# Patient Record
Sex: FEMALE | Race: WHITE
[De-identification: names, ages, dates, MRNs, and addresses within clinical notes are randomized per-mention and may not be internally consistent; named-entity substitution may affect disease eponyms.]

---

## 2017-02-08 ENCOUNTER — HOSPITAL ENCOUNTER (OUTPATIENT)
Dept: HOSPITAL 75 - RAD | Age: 82
End: 2017-02-08
Attending: ORTHOPAEDIC SURGERY
Payer: MEDICARE

## 2017-02-08 DIAGNOSIS — M51.17: Primary | ICD-10-CM

## 2017-02-08 DIAGNOSIS — M41.9: ICD-10-CM

## 2017-02-08 PROCEDURE — 72148 MRI LUMBAR SPINE W/O DYE: CPT

## 2017-02-08 NOTE — XMS REPORT
Continuity of Care Document

 Created on: 2015



MASSIEL LOPEZ

External Reference #: [id number]

: 1931

Sex: Female



Demographics







 Address  [address] [place]  [address]

 

 Home Phone  [phone]

 

 Preferred Language  English

 

 Marital Status  Unknown

 

 Restorationism Affiliation  Unknown

 

 Race  Unknown

 

 Ethnic Group  Unknown





Author







 Author  [facility]

 

 Organization  [facility]

 

 Address  Unknown

 

 Phone  Unavailable







Support







 Name  Relationship  Address  Phone

 

 ASHLEY MARMOLEJO DO  Caregiver  [address]

[place]  [address] [phone]

 

 JACINTA WOODWARD FACP CCDS MD FACC  Caregiver  [address]

[place]  [address] [phone]

 

 KASIA GARSIA  Next Of Kin  K

[place]  [address] [phone]







Care Team Providers







 Care Team Member Name  Role  Phone

 

 ASHLEY MARMOLEJO DO  PCP  [phone]







Insurance Providers







 Payer Name  Policy Number  Subscriber Name  Relationship

 

 W[employer] Medicare  [id number]  Massiel Lopez  18 Self / Same As Patient

 

  For Life  [id number]  David Lopez  01 







Advance Directives







 Directive  Response  Recorded Date/Time

 

 Advance Directives  No  06/23/15 11:15am

 

 Health Care Power of   No  06/23/15 11:15am

 

 Organ Donor  Yes  06/23/15 11:15am

 

 Resuscitation Status  Full Code  06/23/15 11:15am







Problems

Medical Problems





 Problem  Onset Date  Status

 

 Anemia due to blood loss  Unknown  Active

 

 Chest pain  Unknown  Active

 

 Upper gastrointestinal hemorrhage  Unknown  Active

 

 Diarrhea  Unknown  Active

 

 Nausea and vomiting  Unknown  Active

 

 Diarrhea  Unknown  Active

 

 Atypical chest pain  Unknown  Active

 

 Diarrhea  Unknown  Active







Medications







 Medication  Dose  Route  Sig  Days/Qty  Instructions  Order Date  Discontinued 
Date  Status

 

 Clopidogrel Bisulfate  75 Mg  PO  BEDTIME        06/01/10  01/13/14  
Discontinued

 

 Potassium Chloride (Micro K)  10 Meq  PO  TWICE A DAY        06/01/10  02/21/
12  Discontinued

 

 Pravastatin Sodium  40 Mg  PO  BEDTIME        06/01/10     Active

 

 Metoprolol Tartrate  50 Mg  PO  TWICE A DAY     TAKES 1/2 (100MG) TABLET TWICE 
DAILY  06/01/10     Active

 

 Aspirin  81 Mg  PO  BEDTIME        06/01/10  01/13/14  Discontinued

 

 Multivitamins  1 Cap  PO  DAILY        02/21/12  02/26/15  Discontinued

 

 Nitroglycerin  0.4 Mg  SL  DAILY        06/19/12  02/26/15  Discontinued

 

 Nitroglycerin  0  SL  AS NEEDED PRN CHEST PAIN        14     Active

 

 Isosorbide Mononitrate (Imdur)  30 Mg  PO  DAILY        14     Active

 

 Amlodipine Besylate  5 Mg  PO  DAILY  30 Qty     14     Active

 

 Famotidine (Pepcid)  1 Each  PO  TWICE A DAY  60 Qty     14     Active

 

 Ferrous Fumarate  324 Mg  PO  TWICE A DAY  60 Qty     14     Active

 

 Ondansetron Hcl  4 Mg  SL  EVERY 4HRS PRN NAUSEA/VOMITING  10 Qty  FOR NAUSEA 
AND VOMITING  02/26/15     Active

 

 Clopidogrel Bisulfate  75 Mg  PO  DAILY  30 Qty     06/24/15     Active







Social History







 Social History Problem  Response  Recorded Date/Time

 

 Alcohol Use  Denies Use  2015 6:46pm

 

 Recreational Drug Use  No  2015 6:46pm

 

 Recent Foreign Travel  No  2014 8:21pm

 

 Recent Infectious Disease Exposure  No  2014 8:21pm

 

 Hospitalization with Isolation  Denies  2014 8:21pm

 

 Sexually Transmitted Disease  No  2015 6:46pm

 

 HIV/AIDS  No  2015 6:46pm

 

 Smoking Status  Never a Smoker  2015 11:15am

 

 Do you dip or chew tobacco?  No  2015 11:15am









 Query  Response  Start Date  Stop Date

 

 Smoking Status  Never a Smoker      







Hospital Discharge Instructions

No hospital discharge instructions.



Plan of Care

No plan of care.



Functional Status







 Query  Response  Date Recorded

 

 Comprehension Ability  Understands Concepts

  2015 8:00am







Allergies, Adverse Reactions, Alerts







 Allergen  Type  Severity  Reaction  Status  Last Updated

 

 NKANo Known Allergies  Allergy  Unknown     Active  06







Immunizations







 Name  Given  Type

 

 Date of Pneumonia Vaccine  13  Historical

 

 Date of Influenza Vaccine  10/01/14  Historical

 

 Hepatitis B  No  Historical

 

 Tetanus Booster (TDap)  Unknown  Historical







Vital Signs

Acute Vital Signs





 Vital  Response  Date/Time

 

 Temperature (Fahrenheit)  97.7 degrees F (97.6 - 99.5)   

 

 Temperature (Calculated Celsius)  36.10423 degrees C (36.4 - 37.5)   

 

 Temperature Source  Temporal     

 

 Pulse Rate (adult)  60 bpm (60 - 90)   

 

 Respiratory Rate  18 bpm (12 - 24)   

 

 O2 Sat by Pulse Oximetry  97 % (88 - 100)   

 

 Blood Pressure  97/52 mm Hg   

 

 Pain      

 

    Pain Intensity  0     

 

 Height (Feet)  5 feet    

 

 Height (Inches)  2.00 inches    

 

 Height (Calculated Centimeters)  157.919651 cm    

 

 Weight (Pounds)  129 pounds    

 

 Weight (Ounces)  4.8 oz    

 

 Weight (Calculated Grams)  27689.494 gm    

 

 Weight (Calculated Kilograms)  58.047923 kilograms    

 

 Calculated BMI  23.96     







Results

Laboratory Results







 Test Name  Result  Units  Flags  Reference  Collection Date/Time  Result Date/
Time  Comments

 

 White Blood Count  4.3  10^3/uL     4.3-11.0  2015 4:36am  2015 5:
05am   

 

 Red Blood Count  3.31  10^6/uL  L  4.35-5.85  2015 4:36am  2015 5:
05am   

 

 Hemoglobin  9.7  G/DL  L  11.5-16.0  2015 4:36am  2015 5:05am   

 

 Hematocrit  30  %  L  35-52  2015 4:36am  2015 5:05am   

 

 Mean Corpuscular Volume  90  FL     80-99  2015 4:36am  2015 5:
05am   

 

 Mean Corpuscular Hemoglobin  29  PG     25-34  2015 4:36am  2015 5:
05am   

 

 Mean Corpuscular Hemoglobin Concent  32  G/DL     32-36  2015 4:36am   5:05am   

 

 Red Cell Distribution Width  12.5  %     10.0-14.5  2015 4:36am  2015 5:05am   

 

 Platelet Count  183  10^3/uL     130-400  2015 4:36am  2015 5:05am
   

 

 Mean Platelet Volume  9.9  FL     7.4-10.4  2015 4:36am  2015 5:
05am   

 

 Prothrombin Time  12.2  SEC     12.2-14.7  2015 11:26am  2015 11:
47am   

 

 INR Comment  0.9        0.8-1.4  2015 11:26am  2015 11:47am       
                INTERPRETIVE DATA

SUGGESTED THERAPEUTIC RANGE FOR INR'S:

   VENOUS THROMBOSIS, PULMONARY EMBOLISM, OR PREVENTION OF

   SYSTEMIC EMBOLISM (EG. IN ATRIAL FIBRILLATION):

                     2.0  -  3.0

   MECHANICAL PROSTHETIC HEART VALVES:

                     2.5  -  3.5*

*NOTE:  INR'S UP TO 4.5 MAY BE NECESSARY IN SELECTED GROUPS 

        OF HIGH RISK PATIENTS.

SIXTH AMERICAN COLLEGE OF CHEST PHYSICIANS CONSENSUS

CONFERENCE ON ANTITHROMBOTIC THERAPY (2000).

 

 Activated Partial Thromboplast Time  27  SEC     24-35  2015 11:26am   11:47am   

 

 Sodium Level  141  MMOL/L     135-145  2015 4:36am  2015 5:24am   

 

 Potassium Level  3.9  MMOL/L     3.6-5.0  2015 4:36am  2015 5:24am
   

 

 Chloride Level  108  MMOL/L  H    2015 4:36am  2015 5:24am   

 

 Carbon Dioxide Level  24  MMOL/L     21-32  2015 4:36am  2015 5:
24am   

 

 Blood Urea Nitrogen  24  MG/DL  H  7-18  2015 4:36am  2015 5:24am 
  

 

 Creatinine  0.94  MG/DL     0.60-1.30  2015 4:36am  2015 5:24am   

 

 BUN/Creatinine Ratio  26           2015 4:36am  2015 5:24am   

 

 Estimat Glomerular Filtration Rate  57           2015 4:36am  2015 
5:24am                    GFR INTERPRETIVE DATA



         UNITS FOR ESTIMATED GFR (eGFR): mL/min/1.73 M2

         REFERENCE RANGE FOR ESTIMATED GFR (eGFR)

                                          eGFR

         NORMAL eGFR                       >60

         MODERATELY DECREASED eGFR          30-59

         SEVERLY DECREASED eGFR             15-29

         KIDNEY FAILURE                    <15 (OR DIALYSIS)

 

 Glucose Level  97  MG/DL       2015 4:36am  2015 5:24am   

 

 Calcium Level  8.5  MG/DL     8.5-10.1  2015 4:36am  2015 5:24am   

 

 Total Bilirubin  0.9  MG/DL     0.1-1.0  2015 11:26am  2015 11:
50am   

 

 Alkaline Phosphatase  57  U/L       2015 11:26am  2015 11:
50am   

 

 Aspartate Amino Transf (AST/SGOT)  14  U/L     5-34  2015 11:26am  2015 11:50am   

 

 Alanine Aminotransferase (ALT/SGPT)  7  U/L     0-55  2015 11:am   11:50am   

 

 Total Protein  6.9  G/DL     6.4-8.2  2015 11:am  2015 11:50am   

 

 Albumin  4.4  G/DL     3.2-4.5  2015 11:am  2015 11:50am   

 

 Triglycerides Level  96  MG/DL     <150  2015 11:26am  2015 11:
50am   

 

 Cholesterol Level  173  MG/DL     < 200  2015 11:26am  2015 11:
50am   

 

 HDL Cholesterol  61  MG/DL  H  40-60  2015 11:am  2015 11:50am   

 

 LDL Cholesterol Direct  87  MG/DL     1-129  2015 11:26am  2015 11:
50am   

 

 VLDL Cholesterol  19  MG/DL     5-40  2015 11:am  2015 11:50am   







Procedures







 Procedure  Status  Date  Provider(s)

 

 Tracing only of electrocardiogram  completed  06/23/15  JACINTA WOODWARD MD FACP 
FACC CCDS

 

 Tracing only of electrocardiogram  completed  06/24/15  JACINTA WOODWARD MD FACP 
FACC CCDS







Encounters







 Encounter  Location  Date/Time

 

 Registered Surgical Day Care  [facility]  06/25/15 4:34pm

 

 Registered Clinic  [facility]  06/18/15 7:01am

 

 Registered Clinic  [facility]  06/04/15 10:00am

## 2017-02-08 NOTE — DIAGNOSTIC IMAGING REPORT
PROCEDURE: MRI lumbar spine.



TECHNIQUE: Multiplanar, multisequence MRI of the lumbar spine

was performed without contrast.



INDICATION: Low back pain.



FINDINGS: There is satisfactory alignment of the lumbar spine.

There is an old 20% compression fracture involving the T11

vertebral body. This is associated with flattening of the

posterior margin of the vertebral body and minimal disc bulge at

T10-T11 level with associated ligamentum flavum thickening

resulting in mild spinal canal stenosis without cord

compression.



The cauda equina and conus medullaris appear grossly

unremarkable. There is no suspicious focus of marrow signal

abnormality or mass. Mild Modic type II changes near the

endplates of vertebral bodies are noted. There is disc

desiccation at all levels. No significant disc height loss at

any level.



T11-T12: There is a mild disc bulge with no spinal canal or

foraminal stenosis.



T12-L1: There is a mild diffuse disc bulge with no significant

spinal canal or foraminal stenosis.



L1-L2: There is mild disc bulge with mild facet hypertrophy. No

central canal or lateral recess stenosis.



L2-L3: There is a diffuse disc bulge and moderate facet

hypertrophy bilaterally. There is a small synovial cyst

measuring 4 x 3 x 5 mm arising from the medial aspect of the

right facet joint at this level projecting into the posterior

central aspect of the central canal. There is moderate central

canal stenosis at this level with reduced AP dimension of the

canal to 7 mm. There is only mild lateral recess stenosis on the

left and no lateral recess stenosis on the right side. The

foramina demonstrate mild stenosis bilaterally.



L3-L4: There is a mild diffuse disc bulge. There is mild facet

hypertrophy. No central canal or lateral recess stenosis. There

is mild-to-moderate left foraminal stenosis. The right foramen

is patent.



L4-L5: There is a diffuse disc bulge and moderate facet

hypertrophy bilaterally. There is no significant central canal

stenosis. There is minimal lateral recess stenosis on the left

side only. The foramina demonstrate mild narrowing on the left

side. Patent right foramen seen.



L5-S1: There is a mild disc bulge and moderate facet arthropathy

bilaterally. No central canal stenosis. There is mild lateral

recess stenosis on the left and patent lateral recess on the

right side. No significant foraminal stenosis is seen on either

side.



IMPRESSION: Combination of disc bulge, moderate facet

arthropathy and a synovial cyst arising from the right facet

joint at the L2-L3 level projecting into the posterior aspect of

the spinal canal, results in moderate central canal stenosis.

Other findings as above.



Dictated by:



Dictated on workstation # [id number]

## 2017-03-10 ENCOUNTER — HOSPITAL ENCOUNTER (OUTPATIENT)
Dept: HOSPITAL 75 - CARD | Age: 82
Discharge: HOME | End: 2017-03-10
Attending: PAIN MEDICINE
Payer: MEDICARE

## 2017-03-10 VITALS — HEIGHT: 62 IN | WEIGHT: 135 LBS | BODY MASS INDEX: 24.84 KG/M2

## 2017-03-10 VITALS — DIASTOLIC BLOOD PRESSURE: 101 MMHG | SYSTOLIC BLOOD PRESSURE: 184 MMHG

## 2017-03-10 VITALS — SYSTOLIC BLOOD PRESSURE: 169 MMHG | DIASTOLIC BLOOD PRESSURE: 98 MMHG

## 2017-03-10 DIAGNOSIS — M51.16: Primary | ICD-10-CM

## 2017-03-10 DIAGNOSIS — M16.12: ICD-10-CM

## 2017-03-10 DIAGNOSIS — Z79.02: ICD-10-CM

## 2017-03-10 DIAGNOSIS — Z79.899: ICD-10-CM

## 2017-03-10 PROCEDURE — 77002 NEEDLE LOCALIZATION BY XRAY: CPT

## 2017-03-10 PROCEDURE — 62323 NJX INTERLAMINAR LMBR/SAC: CPT

## 2017-03-10 PROCEDURE — 20610 DRAIN/INJ JOINT/BURSA W/O US: CPT

## 2017-03-10 NOTE — PAIN MEDICINE-PROCEDURE
Procedure


Pre-Op/Post-Op Diagnosis


Diagnosis:  Disc disorder with radiculopathy, lumbar


Indications for Operation


Low back and hip pain


Attending Surgeon


Ruben





Procedure


Date of Service:  Mar 10, 2017


Procedure: Lumbar Epidural Steroid Injection at the L2-L3 level and left hip 

intra-articular steroid injection under Fluoroscopic Guidance





Procedure:


Patient was identified in the holding area. After risks, benefits, and 

alternatives were discussed with the patient, informed consent was obtained.  

Patient held her Plavix for 7 days prior to procedure.  Patient was brought to 

the fluoroscopy suite and placed prone on the procedure room table. A time out 

was performed.    Vital signs were monitored throughout the procedure. The 

patients low back was prepped and draped in the usual sterile fashion. The 

patients skin was anesthetized using 2% Lidocaine. A Tuohy needle was inserted 

and advanced to the L2-L3 epidural space under fluoroscopic guidance using the 

loss of resistance technique and intermittent projection of fluoroscopy. There 

was no paresthesia with needle placement.





The needle position was confirmed in both the AP and lateral view.





After negative aspiration 2ml of   contrast was injected under live fluoroscopy 

which showed good spread of the contrast in the epidural space at the 

appropriate level, there was no intravascular or subarachnoid spread. 





Again, after negative aspiration for heme or CSF, 2 ml of 0.25% Bupivicaine, 

2ml of preservative free normal saline, and 80mg of Kenalog was injected. The 

needle was removed and a sterile bandage was placed .





Attention was then directed to the left hip joint after returning patient to 

the supine position.The left hip was prepped with antiseptic solution.  Then, 

the joint was identified under ultrasound guidance with a sterile ultrasound 

cover.  2 ml's of 1% Lidocaine  was used to anesthetize the skin.  A 22 gauge 

3.5 inch spinal needle was inserted through the skin under fluoroscopic 

guidance until the needle touched the femur at the neck on the left side.  Then

, after negative aspiration, 2 ml of Omnipaque 300 dye was injected under 

fluoroscopic guidance which showed good spread of the dye inside the joint. Then

, after negative aspiration, 40 mg of kenalog was injected mixed with 4 ml of 

0.25% Marcaine.  The needle was then flushed with 1% lidocaine and removed.





After a brief period of observation, patient was discharged to home with no new 

neurological deficits and no apparent complications.


Complications


None








ZEE ESPINO MD Mar 10, 2017 12:24 pm

## 2017-03-10 NOTE — XMS REPORT
Continuity of Care Document

 Created on: 2015



MASSIEL LOPEZ

External Reference #: [id number]

: 1931

Sex: Female



Demographics







 Address  [address] [place]  [address]

 

 Home Phone  [phone]

 

 Preferred Language  English

 

 Marital Status  Unknown

 

 Mosque Affiliation  Unknown

 

 Race  Unknown

 

 Ethnic Group  Unknown





Author







 Author  [facility]

 

 Organization  [facility]

 

 Address  Unknown

 

 Phone  Unavailable







Support







 Name  Relationship  Address  Phone

 

 ASHLEY MARMOLEJO DO  Caregiver  [address]

[place]  [address] [phone]

 

 JACINTA WOODWARD FACP CCDS MD FACC  Caregiver  [address]

[place]  [address] [phone]

 

 KASIA GARSIA  Next Of Kin  [place]

[place]  [address] [phone]







Care Team Providers







 Care Team Member Name  Role  Phone

 

 ASHLEY MARMOLEJO DO  PCP  [phone]







Insurance Providers







 Payer Name  Policy Number  Subscriber Name  Relationship

 

 Wps Medicare  [id number]  Massiel Lopez  18 Self / Same As Patient

 

  For Life  [id number]  David Lopez  01 







Advance Directives







 Directive  Response  Recorded Date/Time

 

 Advance Directives  No  06/23/15 11:15am

 

 Health Care Power of   No  06/23/15 11:15am

 

 Organ Donor  Yes  06/23/15 11:15am

 

 Resuscitation Status  Full Code  06/23/15 11:15am







Problems

Medical Problems





 Problem  Onset Date  Status

 

 Anemia due to blood loss  Unknown  Active

 

 Chest pain  Unknown  Active

 

 Upper gastrointestinal hemorrhage  Unknown  Active

 

 Diarrhea  Unknown  Active

 

 Nausea and vomiting  Unknown  Active

 

 Diarrhea  Unknown  Active

 

 Atypical chest pain  Unknown  Active

 

 Diarrhea  Unknown  Active







Medications







 Medication  Dose  Route  Sig  Days/Qty  Instructions  Order Date  Discontinued 
Date  Status

 

 Clopidogrel Bisulfate  75 Mg  PO  BEDTIME        06/01/10  01/13/14  
Discontinued

 

 Potassium Chloride (Micro K)  10 Meq  PO  TWICE A DAY        06/01/10  02/21/
12  Discontinued

 

 Pravastatin Sodium  40 Mg  PO  BEDTIME        06/01/10     Active

 

 Metoprolol Tartrate  50 Mg  PO  TWICE A DAY     TAKES 1/2 (100MG) TABLET TWICE 
DAILY  06/01/10     Active

 

 Aspirin  81 Mg  PO  BEDTIME        06/01/10  01/13/14  Discontinued

 

 Multivitamins  1 Cap  PO  DAILY        02/21/12  02/26/15  Discontinued

 

 Nitroglycerin  0.4 Mg  SL  DAILY        06/19/12  02/26/15  Discontinued

 

 Nitroglycerin  0  SL  AS NEEDED PRN CHEST PAIN        14     Active

 

 Isosorbide Mononitrate (Imdur)  30 Mg  PO  DAILY        14     Active

 

 Amlodipine Besylate  5 Mg  PO  DAILY  30 Qty     14     Active

 

 Famotidine (Pepcid)  1 Each  PO  TWICE A DAY  60 Qty     14     Active

 

 Ferrous Fumarate  324 Mg  PO  TWICE A DAY  60 Qty     14     Active

 

 Ondansetron Hcl  4 Mg  SL  EVERY 4HRS PRN NAUSEA/VOMITING  10 Qty  FOR NAUSEA 
AND VOMITING  02/26/15     Active

 

 Clopidogrel Bisulfate  75 Mg  PO  DAILY  30 Qty     06/24/15     Active







Social History







 Social History Problem  Response  Recorded Date/Time

 

 Alcohol Use  Denies Use  2015 6:46pm

 

 Recreational Drug Use  No  2015 6:46pm

 

 Recent Foreign Travel  No  2014 8:21pm

 

 Recent Infectious Disease Exposure  No  2014 8:21pm

 

 Hospitalization with Isolation  Denies  2014 8:21pm

 

 Sexually Transmitted Disease  No  2015 6:46pm

 

 HIV/AIDS  No  2015 6:46pm

 

 Smoking Status  Never a Smoker  2015 11:15am

 

 Do you dip or chew tobacco?  No  2015 11:15am









 Query  Response  Start Date  Stop Date

 

 Smoking Status  Never a Smoker      







Hospital Discharge Instructions

No hospital discharge instructions.



Plan of Care

No plan of care.



Functional Status







 Query  Response  Date Recorded

 

 Comprehension Ability  Understands Concepts

  2015 8:00am







Allergies, Adverse Reactions, Alerts







 Allergen  Type  Severity  Reaction  Status  Last Updated

 

 NKANo Known Allergies  Allergy  Unknown     Active  06







Immunizations







 Name  Given  Type

 

 Date of Pneumonia Vaccine  13  Historical

 

 Date of Influenza Vaccine  10/01/14  Historical

 

 Hepatitis B  No  Historical

 

 Tetanus Booster (TDap)  Unknown  Historical







Vital Signs

Acute Vital Signs





 Vital  Response  Date/Time

 

 Temperature (Fahrenheit)  97.7 degrees F (97.6 - 99.5)   

 

 Temperature (Calculated Celsius)  36.86347 degrees C (36.4 - 37.5)   

 

 Temperature Source  Temporal     

 

 Pulse Rate (adult)  60 bpm (60 - 90)   

 

 Respiratory Rate  18 bpm (12 - 24)   

 

 O2 Sat by Pulse Oximetry  97 % (88 - 100)   

 

 Blood Pressure  97/52 mm Hg   

 

 Pain      

 

    Pain Intensity  0     

 

 Height (Feet)  5 feet    

 

 Height (Inches)  2.00 inches    

 

 Height (Calculated Centimeters)  157.410894 cm    

 

 Weight (Pounds)  129 pounds    

 

 Weight (Ounces)  4.8 oz    

 

 Weight (Calculated Grams)  98461.494 gm    

 

 Weight (Calculated Kilograms)  58.662896 kilograms    

 

 Calculated BMI  23.96     







Results

Laboratory Results







 Test Name  Result  Units  Flags  Reference  Collection Date/Time  Result Date/
Time  Comments

 

 White Blood Count  4.3  10^3/uL     4.3-11.0  2015 4:36am  2015 5:
05am   

 

 Red Blood Count  3.31  10^6/uL  L  4.35-5.85  2015 4:36am  2015 5:
05am   

 

 Hemoglobin  9.7  G/DL  L  11.5-16.0  2015 4:36am  2015 5:05am   

 

 Hematocrit  30  %  L  35-52  2015 4:36am  2015 5:05am   

 

 Mean Corpuscular Volume  90  FL     80-99  2015 4:36am  2015 5:
05am   

 

 Mean Corpuscular Hemoglobin  29  PG     25-34  2015 4:36am  2015 5:
05am   

 

 Mean Corpuscular Hemoglobin Concent  32  G/DL     32-36  2015 4:36am   5:05am   

 

 Red Cell Distribution Width  12.5  %     10.0-14.5  2015 4:36am  2015 5:05am   

 

 Platelet Count  183  10^3/uL     130-400  2015 4:36am  2015 5:05am
   

 

 Mean Platelet Volume  9.9  FL     7.4-10.4  2015 4:36am  2015 5:
05am   

 

 Prothrombin Time  12.2  SEC     12.2-14.7  2015 11:26am  2015 11:
47am   

 

 INR Comment  0.9        0.8-1.4  2015 11:26am  2015 11:47am       
                INTERPRETIVE DATA

SUGGESTED THERAPEUTIC RANGE FOR INR'S:

   VENOUS THROMBOSIS, PULMONARY EMBOLISM, OR PREVENTION OF

   SYSTEMIC EMBOLISM (EG. IN ATRIAL FIBRILLATION):

                     2.0  -  3.0

   MECHANICAL PROSTHETIC HEART VALVES:

                     2.5  -  3.5*

*NOTE:  INR'S UP TO 4.5 MAY BE NECESSARY IN SELECTED GROUPS 

        OF HIGH RISK PATIENTS.

SIXTH AMERICAN COLLEGE OF CHEST PHYSICIANS CONSENSUS

CONFERENCE ON ANTITHROMBOTIC THERAPY (2000).

 

 Activated Partial Thromboplast Time  27  SEC     24-35  2015 11:26am   11:47am   

 

 Sodium Level  141  MMOL/L     135-145  2015 4:36am  2015 5:24am   

 

 Potassium Level  3.9  MMOL/L     3.6-5.0  2015 4:36am  2015 5:24am
   

 

 Chloride Level  108  MMOL/L  H    2015 4:36am  2015 5:24am   

 

 Carbon Dioxide Level  24  MMOL/L     21-32  2015 4:36am  2015 5:
24am   

 

 Blood Urea Nitrogen  24  MG/DL  H  7-18  2015 4:36am  2015 5:24am 
  

 

 Creatinine  0.94  MG/DL     0.60-1.30  2015 4:36am  2015 5:24am   

 

 BUN/Creatinine Ratio  26           2015 4:36am  2015 5:24am   

 

 Estimat Glomerular Filtration Rate  57           2015 4:36am  2015 
5:24am                    GFR INTERPRETIVE DATA



         UNITS FOR ESTIMATED GFR (eGFR): mL/min/1.73 M2

         REFERENCE RANGE FOR ESTIMATED GFR (eGFR)

                                          eGFR

         NORMAL eGFR                       >60

         MODERATELY DECREASED eGFR          30-59

         SEVERLY DECREASED eGFR             15-29

         KIDNEY FAILURE                    <15 (OR DIALYSIS)

 

 Glucose Level  97  MG/DL       2015 4:36am  2015 5:24am   

 

 Calcium Level  8.5  MG/DL     8.5-10.1  2015 4:36am  2015 5:24am   

 

 Total Bilirubin  0.9  MG/DL     0.1-1.0  2015 11:26am  2015 11:
50am   

 

 Alkaline Phosphatase  57  U/L       2015 11:26am  2015 11:
50am   

 

 Aspartate Amino Transf (AST/SGOT)  14  U/L     5-34  2015 11:26am  2015 11:50am   

 

 Alanine Aminotransferase (ALT/SGPT)  7  U/L     0-55  2015 11:am   11:50am   

 

 Total Protein  6.9  G/DL     6.4-8.2  2015 11:am  2015 11:50am   

 

 Albumin  4.4  G/DL     3.2-4.5  2015 11:am  2015 11:50am   

 

 Triglycerides Level  96  MG/DL     <150  2015 11:26am  2015 11:
50am   

 

 Cholesterol Level  173  MG/DL     < 200  2015 11:26am  2015 11:
50am   

 

 HDL Cholesterol  61  MG/DL  H  40-60  2015 11:am  2015 11:50am   

 

 LDL Cholesterol Direct  87  MG/DL     1-129  2015 11:26am  2015 11:
50am   

 

 VLDL Cholesterol  19  MG/DL     5-40  2015 11:am  2015 11:50am   







Procedures







 Procedure  Status  Date  Provider(s)

 

 Tracing only of electrocardiogram  completed  06/23/15  JACINTA WOODWARD MD FACP 
FACC CCDS

 

 Tracing only of electrocardiogram  completed  06/24/15  JACINTA WOODWARD MD FACP 
FACC CCDS







Encounters







 Encounter  Location  Date/Time

 

 Registered Surgical Day Care  [facility]  06/25/15 4:34pm

 

 Registered Clinic  [facility]  06/18/15 7:01am

 

 Registered Clinic  [facility]  06/04/15 10:00am

## 2017-03-18 ENCOUNTER — HOSPITAL ENCOUNTER (EMERGENCY)
Dept: HOSPITAL 75 - ER | Age: 82
Discharge: HOME | End: 2017-03-18
Payer: MEDICARE

## 2017-03-18 VITALS — SYSTOLIC BLOOD PRESSURE: 177 MMHG | DIASTOLIC BLOOD PRESSURE: 78 MMHG

## 2017-03-18 VITALS — BODY MASS INDEX: 24.84 KG/M2 | WEIGHT: 135 LBS | HEIGHT: 62 IN

## 2017-03-18 DIAGNOSIS — Z79.02: ICD-10-CM

## 2017-03-18 DIAGNOSIS — I25.10: ICD-10-CM

## 2017-03-18 DIAGNOSIS — R19.7: Primary | ICD-10-CM

## 2017-03-18 DIAGNOSIS — Z79.899: ICD-10-CM

## 2017-03-18 DIAGNOSIS — Z95.5: ICD-10-CM

## 2017-03-18 DIAGNOSIS — I10: ICD-10-CM

## 2017-03-18 LAB
ALBUMIN SERPL-MCNC: 3.7 G/DL (ref 3.2–4.5)
ALT SERPL-CCNC: 13 U/L (ref 0–55)
ANION GAP SERPL CALC-SCNC: 7 MMOL/L (ref 5–14)
APTT BLD: 24 SEC (ref 24–35)
AST SERPL-CCNC: 11 U/L (ref 5–34)
BASOPHILS # BLD AUTO: 0 10^3/UL (ref 0–0.1)
BASOPHILS NFR BLD AUTO: 0 % (ref 0–10)
BILIRUB SERPL-MCNC: 0.4 MG/DL (ref 0.1–1)
BUN SERPL-MCNC: 23 MG/DL (ref 7–18)
BUN/CREAT SERPL: 28
CALCIUM SERPL-MCNC: 8.3 MG/DL (ref 8.5–10.1)
CHLORIDE SERPL-SCNC: 115 MMOL/L (ref 98–107)
CO2 SERPL-SCNC: 21 MMOL/L (ref 21–32)
CREAT SERPL-MCNC: 0.83 MG/DL (ref 0.6–1.3)
EOSINOPHIL # BLD AUTO: 0.1 10^3/UL (ref 0–0.3)
EOSINOPHIL NFR BLD AUTO: 1 % (ref 0–10)
ERYTHROCYTE [DISTWIDTH] IN BLOOD BY AUTOMATED COUNT: 13.3 % (ref 10–14.5)
GFR SERPLBLD BASED ON 1.73 SQ M-ARVRAT: > 60 ML/MIN
GLUCOSE SERPL-MCNC: 96 MG/DL (ref 70–105)
INR PPP: 0.9 (ref 0.8–1.4)
LYMPHOCYTES # BLD AUTO: 1.2 X 10^3 (ref 1–4)
LYMPHOCYTES NFR BLD AUTO: 15 % (ref 12–44)
MAGNESIUM SERPL-MCNC: 2 MG/DL (ref 1.8–2.4)
MCH RBC QN AUTO: 30 PG (ref 25–34)
MCHC RBC AUTO-ENTMCNC: 33 G/DL (ref 32–36)
MCV RBC AUTO: 90 FL (ref 80–99)
MONOCYTES # BLD AUTO: 0.7 X 10^3 (ref 0–1)
MONOCYTES NFR BLD AUTO: 9 % (ref 0–12)
NEUTROPHILS # BLD AUTO: 6 X 10^3 (ref 1.8–7.8)
NEUTROPHILS NFR BLD AUTO: 75 % (ref 42–75)
NEUTS BAND NFR BLD MANUAL: 75 %
NEUTS BAND NFR BLD: 7 %
PLATELET # BLD: 187 10^3/UL (ref 130–400)
PMV BLD AUTO: 11 FL (ref 7.4–10.4)
POTASSIUM SERPL-SCNC: 3.8 MMOL/L (ref 3.6–5)
PROT SERPL-MCNC: 5.7 G/DL (ref 6.4–8.2)
PROTHROMBIN TIME: 12.2 SEC (ref 12.2–14.7)
RBC # BLD AUTO: 3.89 10^6/UL (ref 4.35–5.85)
SODIUM SERPL-SCNC: 143 MMOL/L (ref 135–145)
VARIANT LYMPHS NFR BLD MANUAL: 14 %
WBC # BLD AUTO: 7.9 10^3/UL (ref 4.3–11)

## 2017-03-18 PROCEDURE — 80053 COMPREHEN METABOLIC PANEL: CPT

## 2017-03-18 PROCEDURE — 85610 PROTHROMBIN TIME: CPT

## 2017-03-18 PROCEDURE — 85730 THROMBOPLASTIN TIME PARTIAL: CPT

## 2017-03-18 PROCEDURE — 36415 COLL VENOUS BLD VENIPUNCTURE: CPT

## 2017-03-18 PROCEDURE — 83735 ASSAY OF MAGNESIUM: CPT

## 2017-03-18 PROCEDURE — 85027 COMPLETE CBC AUTOMATED: CPT

## 2017-03-18 PROCEDURE — 85007 BL SMEAR W/DIFF WBC COUNT: CPT

## 2017-03-18 NOTE — XMS REPORT
Continuity of Care Document

 Created on: 2015



MASSIEL LOPEZ

External Reference #: [id number]

: 1931

Sex: Female



Demographics







 Address  [address] [place]  [address]

 

 Home Phone  [phone]

 

 Preferred Language  English

 

 Marital Status  Unknown

 

 Jainism Affiliation  Unknown

 

 Race  Unknown

 

 Ethnic Group  Unknown





Author







 Author  [facility]

 

 Organization  [facility]

 

 Address  Unknown

 

 Phone  Unavailable







Support







 Name  Relationship  Address  Phone

 

 ASHLEY MARMOLEJO DO  Caregiver  [address]

[place]  [address] [phone]

 

 JACINTA WOODWARD FACP CCDS MD FACC  Caregiver  [address]

[place]  [address] [phone]

 

 KASIA GARSIA  Next Of Kin  [place]

[place]  [address] [phone]







Care Team Providers







 Care Team Member Name  Role  Phone

 

 ASHLEY MARMOLEJO DO  PCP  [phone]







Insurance Providers







 Payer Name  Policy Number  Subscriber Name  Relationship

 

 [employer]  [id number]  Massiel Lopez  18 Self / Same As Patient

 

  For Life  [id number]  David Lopez  01 







Advance Directives







 Directive  Response  Recorded Date/Time

 

 Advance Directives  No  06/23/15 11:15am

 

 Health Care Power of   No  06/23/15 11:15am

 

 Organ Donor  Yes  06/23/15 11:15am

 

 Resuscitation Status  Full Code  06/23/15 11:15am







Problems

Medical Problems





 Problem  Onset Date  Status

 

 Anemia due to blood loss  Unknown  Active

 

 Chest pain  Unknown  Active

 

 Upper gastrointestinal hemorrhage  Unknown  Active

 

 Diarrhea  Unknown  Active

 

 Nausea and vomiting  Unknown  Active

 

 Diarrhea  Unknown  Active

 

 Atypical chest pain  Unknown  Active

 

 Diarrhea  Unknown  Active







Medications







 Medication  Dose  Route  Sig  Days/Qty  Instructions  Order Date  Discontinued 
Date  Status

 

 Clopidogrel Bisulfate  75 Mg  PO  BEDTIME        06/01/10  01/13/14  
Discontinued

 

 Potassium Chloride (Micro K)  10 Meq  PO  TWICE A DAY        06/01/10  02/21/
12  Discontinued

 

 Pravastatin Sodium  40 Mg  PO  BEDTIME        06/01/10     Active

 

 Metoprolol Tartrate  50 Mg  PO  TWICE A DAY     TAKES 1/2 (100MG) TABLET TWICE 
DAILY  06/01/10     Active

 

 Aspirin  81 Mg  PO  BEDTIME        06/01/10  01/13/14  Discontinued

 

 Multivitamins  1 Cap  PO  DAILY        02/21/12  02/26/15  Discontinued

 

 Nitroglycerin  0.4 Mg  SL  DAILY        06/19/12  02/26/15  Discontinued

 

 Nitroglycerin  0  SL  AS NEEDED PRN CHEST PAIN        14     Active

 

 Isosorbide Mononitrate (Imdur)  30 Mg  PO  DAILY        14     Active

 

 Amlodipine Besylate  5 Mg  PO  DAILY  30 Qty     14     Active

 

 Famotidine (Pepcid)  1 Each  PO  TWICE A DAY  60 Qty     14     Active

 

 Ferrous Fumarate  324 Mg  PO  TWICE A DAY  60 Qty     14     Active

 

 Ondansetron Hcl  4 Mg  SL  EVERY 4HRS PRN NAUSEA/VOMITING  10 Qty  FOR NAUSEA 
AND VOMITING  02/26/15     Active

 

 Clopidogrel Bisulfate  75 Mg  PO  DAILY  30 Qty     06/24/15     Active







Social History







 Social History Problem  Response  Recorded Date/Time

 

 Alcohol Use  Denies Use  2015 6:46pm

 

 Recreational Drug Use  No  2015 6:46pm

 

 Recent Foreign Travel  No  2014 8:21pm

 

 Recent Infectious Disease Exposure  No  2014 8:21pm

 

 Hospitalization with Isolation  Denies  2014 8:21pm

 

 Sexually Transmitted Disease  No  2015 6:46pm

 

 HIV/AIDS  No  2015 6:46pm

 

 Smoking Status  Never a Smoker  2015 11:15am

 

 Do you dip or chew tobacco?  No  2015 11:15am









 Query  Response  Start Date  Stop Date

 

 Smoking Status  Never a Smoker      







Hospital Discharge Instructions

No hospital discharge instructions.



Plan of Care

No plan of care.



Functional Status







 Query  Response  Date Recorded

 

 Comprehension Ability  Understands Concepts

  2015 8:00am







Allergies, Adverse Reactions, Alerts







 Allergen  Type  Severity  Reaction  Status  Last Updated

 

 NKANo Known Allergies  Allergy  Unknown     Active  06







Immunizations







 Name  Given  Type

 

 Date of Pneumonia Vaccine  13  Historical

 

 Date of Influenza Vaccine  10/01/14  Historical

 

 Hepatitis B  No  Historical

 

 Tetanus Booster (TDap)  Unknown  Historical







Vital Signs

Acute Vital Signs





 Vital  Response  Date/Time

 

 Temperature (Fahrenheit)  97.7 degrees F (97.6 - 99.5)   

 

 Temperature (Calculated Celsius)  36.96396 degrees C (36.4 - 37.5)   

 

 Temperature Source  Temporal     

 

 Pulse Rate (adult)  60 bpm (60 - 90)   

 

 Respiratory Rate  18 bpm (12 - 24)   

 

 O2 Sat by Pulse Oximetry  97 % (88 - 100)   

 

 Blood Pressure  97/52 mm Hg   

 

 Pain      

 

    Pain Intensity  0     

 

 Height (Feet)  5 feet    

 

 Height (Inches)  2.00 inches    

 

 Height (Calculated Centimeters)  157.526922 cm    

 

 Weight (Pounds)  129 pounds    

 

 Weight (Ounces)  4.8 oz    

 

 Weight (Calculated Grams)  45246.494 gm    

 

 Weight (Calculated Kilograms)  58.262508 kilograms    

 

 Calculated BMI  23.96     







Results

Laboratory Results







 Test Name  Result  Units  Flags  Reference  Collection Date/Time  Result Date/
Time  Comments

 

 White Blood Count  4.3  10^3/uL     4.3-11.0  2015 4:36am  2015 5:
05am   

 

 Red Blood Count  3.31  10^6/uL  L  4.35-5.85  2015 4:36am  2015 5:
05am   

 

 Hemoglobin  9.7  G/DL  L  11.5-16.0  2015 4:36am  2015 5:05am   

 

 Hematocrit  30  %  L  35-52  2015 4:36am  2015 5:05am   

 

 Mean Corpuscular Volume  90  FL     80-99  2015 4:36am  2015 5:
05am   

 

 Mean Corpuscular Hemoglobin  29  PG     25-34  2015 4:36am  2015 5:
05am   

 

 Mean Corpuscular Hemoglobin Concent  32  G/DL     32-36  2015 4:36am   5:05am   

 

 Red Cell Distribution Width  12.5  %     10.0-14.5  2015 4:36am  2015 5:05am   

 

 Platelet Count  183  10^3/uL     130-400  2015 4:36am  2015 5:05am
   

 

 Mean Platelet Volume  9.9  FL     7.4-10.4  2015 4:36am  2015 5:
05am   

 

 Prothrombin Time  12.2  SEC     12.2-14.7  2015 11:26am  2015 11:
47am   

 

 INR Comment  0.9        0.8-1.4  2015 11:26am  2015 11:47am       
                INTERPRETIVE DATA

SUGGESTED THERAPEUTIC RANGE FOR INR'S:

   VENOUS THROMBOSIS, PULMONARY EMBOLISM, OR PREVENTION OF

   SYSTEMIC EMBOLISM (EG. IN ATRIAL FIBRILLATION):

                     2.0  -  3.0

   MECHANICAL PROSTHETIC HEART VALVES:

                     2.5  -  3.5*

*NOTE:  INR'S UP TO 4.5 MAY BE NECESSARY IN SELECTED GROUPS 

        OF HIGH RISK PATIENTS.

[employer] CONSENSUS

CONFERENCE ON ANTITHROMBOTIC THERAPY (2000).

 

 Activated Partial Thromboplast Time  27  SEC     24-35  2015 11:26am   11:47am   

 

 Sodium Level  141  MMOL/L     135-145  2015 4:36am  2015 5:24am   

 

 Potassium Level  3.9  MMOL/L     3.6-5.0  2015 4:36am  2015 5:24am
   

 

 Chloride Level  108  MMOL/L  H    2015 4:36am  2015 5:24am   

 

 Carbon Dioxide Level  24  MMOL/L     21-32  2015 4:36am  2015 5:
24am   

 

 Blood Urea Nitrogen  24  MG/DL  H  7-18  2015 4:36am  2015 5:24am 
  

 

 Creatinine  0.94  MG/DL     0.60-1.30  2015 4:36am  2015 5:24am   

 

 BUN/Creatinine Ratio  26           2015 4:36am  2015 5:24am   

 

 Estimat Glomerular Filtration Rate  57           2015 4:36am  2015 
5:24am                    GFR INTERPRETIVE DATA



         UNITS FOR ESTIMATED GFR (eGFR): mL/min/1.73 M2

         REFERENCE RANGE FOR ESTIMATED GFR (eGFR)

                                          eGFR

         NORMAL eGFR                       >60

         MODERATELY DECREASED eGFR          30-59

         SEVERLY DECREASED eGFR             15-29

         KIDNEY FAILURE                    <15 (OR DIALYSIS)

 

 Glucose Level  97  MG/DL       2015 4:36am  2015 5:24am   

 

 Calcium Level  8.5  MG/DL     8.5-10.1  2015 4:36am  2015 5:24am   

 

 Total Bilirubin  0.9  MG/DL     0.1-1.0  2015 11:26am  2015 11:
50am   

 

 Alkaline Phosphatase  57  U/L       2015 11:26am  2015 11:
50am   

 

 Aspartate Amino Transf (AST/SGOT)  14  U/L     5-34  2015 11:26am  2015 11:50am   

 

 Alanine Aminotransferase (ALT/SGPT)  7  U/L     0-55  2015 11:am   11:50am   

 

 Total Protein  6.9  G/DL     6.4-8.2  2015 11:am  2015 11:50am   

 

 Albumin  4.4  G/DL     3.2-4.5  2015 11:am  2015 11:50am   

 

 Triglycerides Level  96  MG/DL     <150  2015 11:26am  2015 11:
50am   

 

 Cholesterol Level  173  MG/DL     < 200  2015 11:26am  2015 11:
50am   

 

 HDL Cholesterol  61  MG/DL  H  40-60  2015 11:am  2015 11:50am   

 

 LDL Cholesterol Direct  87  MG/DL     1-129  2015 11:26am  2015 11:
50am   

 

 VLDL Cholesterol  19  MG/DL     5-40  2015 11:am  2015 11:50am   







Procedures







 Procedure  Status  Date  Provider(s)

 

 Tracing only of electrocardiogram  completed  06/23/15  JACINTA WOODWARD MD FACP 
FACC CCDS

 

 Tracing only of electrocardiogram  completed  06/24/15  JACINTA WOODWARD MD FACP 
FACC CCDS







Encounters







 Encounter  Location  Date/Time

 

 Registered [facility] Day [facility]  [facility]  06/25/15 4:34pm

 

 Registered Clinic  [facility]  06/18/15 7:01am

 

 Registered Clinic  [facility]  06/04/15 10:00am

## 2017-03-18 NOTE — ED GI
General


Chief Complaint:  Abdominal/GI Problems


Stated Complaint:  DIARRHEA


Source of Information:  Patient


Exam Limitations:  No Limitations





History of Present Illness


Time Seen By Provider:  09:24


Initial Comments


This 85 year old ambulatory woman present to ER with complaints of several 

episodes of loose stools and watery diarrhea since yesterday.  She presents a 

sample of stool soiled clothing.  She is primarily concerned because she takes 

Plavix and has a history of GI bleed in which she "almost ".  She denies 

any veronica bleeding or melena.  She denies any pain or nausea.  Denies fever.  

She does have some weakness.  She has a contusion on her right face which is a 

more remote injury from bumping her face on a shelf.





Allergies and Home Medications


Allergies


Coded Allergies:  


     Valerio Known Allergies (Verified  Allergy, Unknown, 06)





Home Medications


Amlodipine Besylate 5 Mg Tab #30 5 MG PO DAILY 


   Prescribed by: GAGAN FARFAN on 14 1124


Clopidogrel Bisulfate 75 Mg Tab #30 75 MG PO DAILY 


   Prescribed by: DOUG CHERRY on 6/24/15 0902


Famotidine 20 Mg Tablet #60 1 EACH PO BID 


   Prescribed by: GAGAN FARFAN on 14 1124


Ferrous Fumarate 324 Mg Tablet #60 324 MG PO BID 


   Prescribed by: GAGAN FARFAN on 14 1126


Isosorbide Mononitrate 30 Mg Tab.sr.24h 30 MG PO DAILY (Reported) 


Metoprolol Tartrate 100 Mg Tablet 50 MG PO BID (Reported) 


   TAKES 1/2 (100MG) TABLET TWICE DAILY 


Nitroglycerin 0.4 Mg Tab 0 SL PRN PRN PRN CHEST PAIN (Reported) 


   1 TAB EVERY 5 MINUTES X 3 DOSES AS NEEDED FOR CHEST PAIN 


Ondansetron Hcl 4 Mg Tab #10 4 MG SL Q4H PRN PRN NAUSEA/VOMITING 


   FOR NAUSEA AND VOMITING 


   Prescribed by: EVELIA PHILLIPS on 2/26/15 2050


Pravastatin Sodium 40 Mg Tablet 40 MG PO HS (Reported) 





Review of Systems


Constitutional:  No fever,  weakness


EENTM:   No Symptoms Reported


Respiratory:   No Symptoms Reported


Cardiovascular:   No Symptoms Reported


Gastrointestinal:   See HPI


Genitourinary:   No Symptoms Reported


Musculoskeletal:   other (chronic back pain)


Skin:   see HPI


Psychiatric/Neurological:   No Symptoms Reported


Endocrine:   No Symptoms Reported


Hematologic/Lymphatic:   See HPI





Past Medical-Social-Family Hx


Patient Social History


Alcohol Use:  Denies Use


Recreational Drug Use:  No


Smoking Status:  Never a Smoker


Recent Foreign Travel:  No


Contact w/Someone Who Travel:  No


Recent Hopitalizations:  Yes (PREVIOUS CATHS)





Immunizations Up To Date


Tetanus Booster (TDap):  Unknown


PED Vaccines UTD:  No


Date of Pneumonia Vaccine:  Sep 20, 2016


Date of Influenza Vaccine:  Sep 29, 2016





Seasonal Allergies


Seasonal Allergies:  No





Surgeries


HX Surgeries:  Yes (STENTS X 8 + ANGIOPLASTY)


Surgeries:  Cardiac, Coronary Stent, Eye Surgery, Gallbladder





Respiratory


Hx Respiratory Disorders:  No





Cardiovascular


Hx Cardiac Disorders:  Yes


Cardiac Disorders:  Chronic Edema/Swelling, Coronary Artery Disease, High 

Cholesterol, Hypertension, Peripheral Vascular





Neurological


Hx Neurological Disorders:  No





Reproductive System


Hx Reproductive Disorders:  No


Sexually Transmitted Disease:  No


HIV/AIDS:  No


Female Reproductive Disorders:  Denies





Genitourinary


Hx Genitourinary Disorders:  No





Gastrointestinal


Hx Gastrointestinal Disorders:  Yes


Gastrointestinal Disorders:  Gastrointestinal Bleed





Musculoskeletal


Hx Musculoskeletal Disorders:  No





Endocrine


Hx Endocrine Disorders:  No





HEENT


HX ENT Disorders:  Yes


HEENT Disorders:  Cataract, Macular Degeneration


Loss of Vision:  Denies


Hearing Impairment:  Denies





Cancer


Hx Cancer:  No





Psychosocial


Hx Psychiatric Problems:  No





Integumentary


HX Skin/Integumentary Disorder:  No





Blood Transfusions


Hx Blood Disorders:  Yes


Adverse Reaction to a Blood Tr:  No





Family Medical History


Family Medial History:  


Cancer


  09 BROTHER, Onset:25's - 30


Chest pain


  03 FATHER, Onset:60 years & older


  03 MOTHER, Onset:60 years & older


Family history: Cardiovascular disease


  03 FATHER, Onset:60 years & older


  03 MOTHER, Onset:60 years & older


Family history: Diabetes mellitus


  03 FATHER, Onset:60 years & older


Family history: Hypertension


  03 MOTHER, Onset:50's - 60


Hypercholesterolemia


  03 FATHER, Onset:50's - 60


Myocardial infarction


  03 FATHER, Onset:60 years & older


  03 MOTHER, Onset:60 years & older





No Family History of:


  Abdominal aortic aneurysm


  Montrose's disease


  Alcoholism


  Aphasia


  Cancer of colon


  Cataract


  Congenital heart disease


  Congestive heart failure


  Cystic fibrosis


  Dementia


  Dysphagia


  Family history: Allergy


  Family history: Alzheimer's disease


  Family history: Arthritis


  Family history: Asthma


  Family history: Breast disease


  Family history: Coronary thrombosis


  Family history: Gastrointestinal disease


  Family history: Glaucoma


  Family history: Osteoporosis


  Family history: Thyroid disorder


  Headache


  Hearing loss


  Heart disease


  Hereditary disease


  History of - anemia


  History of - disorder


  History of - respiratory disease


  History of drug abuse


  Human immunodeficiency virus (HIV) seropositivity


  Infertile


  Kidney disease


  Malignant neoplasm of lung


  Parkinson's disease


  Prostate cancer


  Psychotic disorder


  Seizure disorder


  Stroke


  Tuberculosis


  Visual impairment





Physical Exam


Vital Signs





 VS - Last 72 Hours, by Label








 3/18/17





 09:52


 


Temp 98.7


 


Pulse 61


 


Resp 20


 


B/P 202/108


 


Pulse Ox 98


 


O2 Delivery Room Air





Capillary Refill :


General Appearance:   WD/WN no apparent distress


HEENT:   PERRL/EOMI pharynx normal other (ecchymosis on right cheek)


Neck:   normal inspection


Respiratory:   lungs clear normal breath sounds no respiratory distress no 

accessory muscle use


Cardiovascular:   regular rate, rhythm no edema no murmur


Gastrointestinal:   normal bowel sounds non tender soft


Extremities:   normal inspection no pedal edema


Neurologic/Psychiatric:   CNs II-XII nml as tested no motor/sensory deficits 

alert normal mood/affect oriented x 3


Skin:   normal color warm/dry ecchymosis





Focused Exam


Lactic Acid Level





 Laboratory Tests








Test


  3/18/17


09:41


 


Alanine Aminotransferase


(ALT/SGPT) 13U/L (0-55)  


 


 


Albumin


  3.7G/DL


(3.2-4.5)


 


Alkaline Phosphatase


  47U/L ()


 


 


Anion Gap


  7MMOL/L (5-14)


 


 


Aspartate Amino Transf


(AST/SGOT) 11U/L (5-34)  


 


 


BUN/Creatinine Ratio 28  


 


Blood Urea Nitrogen


  23MG/DL (7-18)


H


 


Calcium Level


  8.3MG/DL


(8.5-10.1)  L


 


Carbon Dioxide Level


  21MMOL/L


(21-32)


 


Chloride Level


  115MMOL/L


()  H


 


Creatinine


  0.83MG/DL


(0.60-1.30)


 


Estimat Glomerular Filtration


Rate > 60  


 


 


Glucose Level


  96MG/DL


()


 


Magnesium Level


  2.0MG/DL


(1.8-2.4)


 


Potassium Level


  3.8MMOL/L


(3.6-5.0)


 


Sodium Level


  143MMOL/L


(135-145)


 


Total Bilirubin


  0.4MG/DL


(0.1-1.0)


 


Total Protein


  5.7G/DL


(6.4-8.2)  L











Progress/Results/Core Measures


Results/Orders


Lab Results





 Laboratory Tests








Test


  3/18/17


09:41 Range/Units


 


 


Activated Partial


Thromboplast Time 24 


  24-35  SEC


 


 


Alanine Aminotransferase


(ALT/SGPT) 13 


  0-55  U/L


 


 


Albumin 3.7  3.2-4.5  G/DL


 


Alkaline Phosphatase 47    U/L


 


Anion Gap 7  5-14  MMOL/L


 


Aspartate Amino Transf


(AST/SGOT) 11 


  5-34  U/L


 


 


BUN/Creatinine Ratio 28   


 


Band Neutrophils 7   %


 


Basophils # (Auto)


  0.0 


  0.0-0.1


10^3/uL


 


Basophils (%) (Auto) 0  0-10  %


 


Blood Morphology Comment NORMAL   


 


Blood Urea Nitrogen 23 H 7-18  MG/DL


 


Calcium Level 8.3 L 8.5-10.1  MG/DL


 


Carbon Dioxide Level 21  21-32  MMOL/L


 


Chloride Level 115 H   MMOL/L


 


Creatinine


  0.83 


  0.60-1.30


MG/DL


 


Eosinophils # (Auto)


  0.1 


  0.0-0.3


10^3/uL


 


Eosinophils (%) (Auto) 1  0-10  %


 


Estimat Glomerular Filtration


Rate > 60 


   


 


 


Glucose Level 96    MG/DL


 


Hematocrit 35  35-52  %


 


Hemoglobin 11.5  11.5-16.0  G/DL


 


Hypersegmented Neutrophils SLIGHT   


 


INR Comment 0.9  0.8-1.4  


 


Lymphocytes # (Auto) 1.2  1.0-4.0  X 10^3


 


Lymphocytes % (Manual) 14   %


 


Lymphocytes (%) (Auto) 15  12-44  %


 


Magnesium Level 2.0  1.8-2.4  MG/DL


 


Mean Corpuscular Hemoglobin 30  25-34  PG


 


Mean Corpuscular Hemoglobin


Concent 33 


  32-36  G/DL


 


 


Mean Corpuscular Volume 90  80-99  FL


 


Mean Platelet Volume 11.0 H 7.4-10.4  FL


 


Monocytes # (Auto) 0.7  0.0-1.0  X 10^3


 


Monocytes % (Manual) 4   %


 


Monocytes (%) (Auto) 9  0-12  %


 


Neutrophils # (Auto) 6.0  1.8-7.8  X 10^3


 


Neutrophils % (Manual) 75   %


 


Neutrophils (%) (Auto) 75  42-75  %


 


Platelet Count


  187 


  130-400


10^3/uL


 


Potassium Level 3.8  3.6-5.0  MMOL/L


 


Prothrombin Time 12.2  12.2-14.7  SEC


 


Red Blood Count


  3.89 L


  4.35-5.85


10^6/uL


 


Red Cell Distribution Width 13.3  10.0-14.5  %


 


Smudge Cells    


 


Sodium Level 143  135-145  MMOL/L


 


Total Bilirubin 0.4  0.1-1.0  MG/DL


 


Total Protein 5.7 L 6.4-8.2  G/DL


 


Toxic Granulation 2+   


 


White Blood Count


  7.9 


  4.3-11.0


10^3/uL








My Orders





 Orders-EVELIA JOY MD


Cbc With Automated Diff (3/18/17 09:22)


Comprehensive Metabolic Panel (3/18/17 09:22)


Magnesium (3/18/17 09:22)


Saline Lock/Iv-Start (3/18/17 09:22)


Protime With Inr (3/18/17 09:31)


Partial Thromboplastin Time (3/18/17 09:31)


Fecal Occult Bedside (3/18/17 09:31)


Manual Differential (3/18/17 09:41)





Vital Signs/I&O





 Vital Sign - Last 12Hours








 3/18/17





 09:52


 


Temp 98.7


 


Pulse 61


 


Resp 20


 


B/P 202/108


 


Pulse Ox 98


 


O2 Delivery Room Air








Progress Note :  


Progress Note


Hemoccult was negative.  Patient had no further diarrhea or other problems 

during her ER stay.  Labs are fairly unremarkable.





Departure


Impression


Impression:  


 Primary Impression:  


 Diarrhea


 Qualified Code:  R19.7 - Diarrhea, unspecified


Disposition:  01 HOME, SELF-CARE


Condition:  Improved





Departure-Patient Inst.


Decision time for Depature:  10:45


Referrals:  


ASHLEY MARMOLEJO DO (PCP/Family)


Primary Care Physician


Patient Instructions:  Diarrhea in Adolescents and Adults





Add. Discharge Instructions:


Drink plenty of clear liquids.  Gradually advance her diet with small 

quantities of bland food as tolerated.  Return to care if symptoms worsen.














All discharge instructions reviewed with patient and/or family. Voiced 

understanding.








EVELIA JOY MD Mar 18, 2017 10:03

## 2017-07-22 ENCOUNTER — HOSPITAL ENCOUNTER (EMERGENCY)
Dept: HOSPITAL 75 - ER | Age: 82
Discharge: HOME | End: 2017-07-22
Payer: MEDICARE

## 2017-07-22 VITALS — SYSTOLIC BLOOD PRESSURE: 199 MMHG | DIASTOLIC BLOOD PRESSURE: 101 MMHG

## 2017-07-22 VITALS — BODY MASS INDEX: 24.84 KG/M2 | HEIGHT: 62 IN | WEIGHT: 135 LBS

## 2017-07-22 DIAGNOSIS — Z04.3: Primary | ICD-10-CM

## 2017-07-22 PROCEDURE — 12002 RPR S/N/AX/GEN/TRNK2.6-7.5CM: CPT

## 2017-07-22 NOTE — ED LOWER EXTREMITY
General


Chief Complaint:  Laceration


Stated Complaint:  R LEG LAC


Source:  patient


Exam Limitations:  no limitations





History of Present Illness


Time seen by provider:  22:09


Initial Comments


To ER with a laceration to the dorsal aspect of the right calf.  Patient was 

sitting on her porch swing when the screw fell out of the chain that was 

attaching the swing to the structure supporting it.  It fell landing on the 

back of her right leg.  She is ambulatory without pain but she is on Plavix and 

is concerned this may need stitches that she's not able to see it very well.  

Her tetanus is up-to-date.


Onset:  just prior to arrival


Severity:  moderate


Pain/Injury Location:  right leg


Modifying Factors:  Worse With Movement





Allergies and Home Medications


Allergies


Coded Allergies:  


     ARTUROANo Known Allergies (Verified  Allergy, Unknown, 12/7/06)





Home Medications


Amlodipine Besylate 5 Mg Tab, 5 MG PO DAILY, #30 Ref 0


   Prescribed by: GAGAN FARFAN on 1/13/14 1124


Clopidogrel Bisulfate 75 Mg Tab, 75 MG PO DAILY, #30 Ref 5


   Prescribed by: DOUG CHERRY on 6/24/15 0902


Famotidine 20 Mg Tablet, 1 EACH PO BID, #60 Ref 0


   Prescribed by: GAGAN FARFAN on 1/13/14 1124


Ferrous Fumarate 324 Mg Tablet, 324 MG PO BID, #60 Ref 0


   Prescribed by: GAGAN FARFAN on 1/13/14 1126


Isosorbide Mononitrate 30 Mg Tab.sr.24h, 30 MG PO DAILY, (Reported)


Metoprolol Tartrate 100 Mg Tablet, 50 MG PO BID, (Reported)


   TAKES 1/2 (100MG) TABLET TWICE DAILY 


Nitroglycerin 0.4 Mg Tab, 0 SL PRN PRN for CHEST PAIN, (Reported)


   1 TAB EVERY 5 MINUTES X 3 DOSES AS NEEDED FOR CHEST PAIN 


Ondansetron Hcl 4 Mg Tab, 4 MG SL Q4H PRN for NAUSEA/VOMITING, #10


   FOR NAUSEA AND VOMITING 


   Prescribed by: EVELIA PHILLIPS on 2/26/15 2050


Pravastatin Sodium 40 Mg Tablet, 40 MG PO HS, (Reported)





Constitutional:  see HPI


EENTM:  see HPI


Respiratory:  no symptoms reported


Cardiovascular:  no symptoms reported


Genitourinary:  no symptoms reported


Musculoskeletal:  no symptoms reported


Skin:  see HPI


Psychiatric/Neurological:  No Symptoms Reported





Past Medical-Social-Family Hx


Patient Social History


Alcohol Use:  Denies Use


Recreational Drug Use:  No


Smoking Status:  Former Smoker


Recent Foreign Travel:  No


Contact w/Someone Who Travel:  No


Recent Hopitalizations:  Yes (PREVIOUS CATHS)





Immunizations Up To Date


Tetanus Booster (TDap):  Unknown


PED Vaccines UTD:  No


Date of Pneumonia Vaccine:  Sep 20, 2016


Date of Influenza Vaccine:  Sep 29, 2016





Seasonal Allergies


Seasonal Allergies:  No





Surgeries


HX Surgeries:  Yes (STENTS X 8 + ANGIOPLASTY)


Surgeries:  Cardiac, Coronary Stent, Eye Surgery, Gallbladder





Respiratory


Hx Respiratory Disorders:  No





Cardiovascular


Hx Cardiac Disorders:  Yes


Cardiac Disorders:  Chronic Edema/Swelling, Coronary Artery Disease, High 

Cholesterol, Hypertension, Peripheral Vascular





Neurological


Hx Neurological Disorders:  No





Reproductive System


Hx Reproductive Disorders:  No


Sexually Transmitted Disease:  No


HIV/AIDS:  No


Female Reproductive Disorders:  Denies





Genitourinary


Hx Genitourinary Disorders:  No





Gastrointestinal


Hx Gastrointestinal Disorders:  Yes


Gastrointestinal Disorders:  Gastrointestinal Bleed





Musculoskeletal


Hx Musculoskeletal Disorders:  No





Endocrine


Hx Endocrine Disorders:  No





HEENT


HX ENT Disorders:  Yes


HEENT Disorders:  Cataract, Macular Degeneration


Loss of Vision:  Denies


Hearing Impairment:  Denies





Cancer


Hx Cancer:  No





Psychosocial


Hx Psychiatric Problems:  No





Integumentary


HX Skin/Integumentary Disorder:  No





Blood Transfusions


Hx Blood Disorders:  Yes


Adverse Reaction to a Blood Tr:  No





Family Medical History


Family Medial History:  


Cancer


  09 BROTHER, Onset:25's - 30


Chest pain


  03 FATHER, Onset:60 years & older


  03 MOTHER, Onset:60 years & older


Family history: Cardiovascular disease


  03 FATHER, Onset:60 years & older


  03 MOTHER, Onset:60 years & older


Family history: Diabetes mellitus


  03 FATHER, Onset:60 years & older


Family history: Hypertension


  03 MOTHER, Onset:50's - 60


Hypercholesterolemia


  03 FATHER, Onset:50's - 60


Myocardial infarction


  03 FATHER, Onset:60 years & older


  03 MOTHER, Onset:60 years & older





No Family History of:


  Abdominal aortic aneurysm


  Allen's disease


  Alcoholism


  Aphasia


  Cancer of colon


  Cataract


  Congenital heart disease


  Congestive heart failure


  Cystic fibrosis


  Dementia


  Dysphagia


  Family history: Allergy


  Family history: Alzheimer's disease


  Family history: Arthritis


  Family history: Asthma


  Family history: Breast disease


  Family history: Coronary thrombosis


  Family history: Gastrointestinal disease


  Family history: Glaucoma


  Family history: Osteoporosis


  Family history: Thyroid disorder


  Headache


  Hearing loss


  Heart disease


  Hereditary disease


  History of - anemia


  History of - disorder


  History of - respiratory disease


  History of drug abuse


  Human immunodeficiency virus (HIV) seropositivity


  Infertile


  Kidney disease


  Malignant neoplasm of lung


  Parkinson's disease


  Prostate cancer


  Psychotic disorder


  Seizure disorder


  Stroke


  Tuberculosis


  Visual impairment





Physical Exam


Vital Signs


Capillary Refill :


General Appearance:  WD/WN, no apparent distress


HEENT:  PERRL/EOMI, normal ENT inspection


Neck:  non-tender, full range of motion


Respiratory:  no respiratory distress, no accessory muscle use


Gastrointestinal:  non tender, soft


Hips:  bilateral hip non-tender, bilateral hip normal inspection, bilateral hip 

normal range of motion


Legs:  right leg other (there is a superficial skin tear to the dorsal aspect 

of the right calf without active bleeding.  The superficial skin flap was 

unrolled and reapproximated held in place with Steri-Strips, nonadherent gauze 

and a gauze roll.)


Knees:  bilateral knee non-tender, bilateral knee normal inspection, bilateral 

knee normal range of motion


Ankles:  bilateral ankle non-tender, bilateral ankle normal inspection, 

bilateral ankle normal range of motion


Feet:  bilateral foot non-tender, bilateral foot normal inspection, bilateral 

foot normal range of motion


Neurologic/Psychiatric:  alert, normal mood/affect, oriented x 3


Skin:  normal color, warm/dry





Departure


Impression


Impression:  


 Primary Impression:  


 Skin tear of lower leg without complication


Disposition:  01 HOME, SELF-CARE


Condition:  Stable





Departure-Patient Inst.


Decision time for Depature:  22:11


Referrals:  


ASHLEY MARMOLEJO DO (PCP/Family)


Primary Care Physician


Patient Instructions:  NO INSTRUCTIONS GIVEN





Add. Discharge Instructions:  


1.  Keep this covered with a bandage for the next 48 hours.  After that she may 

remove it and then you may shower getting this area wet.  If you shower before 

then be sure to keep this area dry.  Return to ER for any redness or swelling 

which would be a sign of infection.  All discharge instructions reviewed with 

patient and/or family. Voiced understanding.











OTIS HILLIARD Jul 22, 2017 22:12

## 2017-07-25 ENCOUNTER — HOSPITAL ENCOUNTER (EMERGENCY)
Dept: HOSPITAL 75 - ER | Age: 82
Discharge: LEFT BEFORE DISCHARGE | End: 2017-07-25
Payer: MEDICARE

## 2017-07-25 VITALS — BODY MASS INDEX: 24.84 KG/M2 | WEIGHT: 135 LBS | HEIGHT: 62 IN

## 2017-07-25 VITALS — DIASTOLIC BLOOD PRESSURE: 87 MMHG | SYSTOLIC BLOOD PRESSURE: 188 MMHG

## 2017-07-25 DIAGNOSIS — Z04.3: Primary | ICD-10-CM

## 2017-07-25 LAB
ALBUMIN SERPL-MCNC: 4.3 GM/DL (ref 3.2–4.5)
ALT SERPL-CCNC: 10 U/L (ref 0–55)
ANION GAP SERPL CALC-SCNC: 16 MMOL/L (ref 5–14)
APTT BLD: 26 SEC (ref 24–35)
AST SERPL-CCNC: 14 U/L (ref 5–34)
BASOPHILS # BLD AUTO: 0 10^3/UL (ref 0–0.1)
BASOPHILS NFR BLD AUTO: 0 % (ref 0–10)
BILIRUB SERPL-MCNC: 0.5 MG/DL (ref 0.1–1)
BILIRUB UR QL STRIP: (no result)
BUN SERPL-MCNC: 23 MG/DL (ref 7–18)
BUN/CREAT SERPL: 26
CALCIUM SERPL-MCNC: 9.6 MG/DL (ref 8.5–10.1)
CHLORIDE SERPL-SCNC: 109 MMOL/L (ref 98–107)
CO2 SERPL-SCNC: 21 MMOL/L (ref 21–32)
CREAT SERPL-MCNC: 0.89 MG/DL (ref 0.6–1.3)
EOSINOPHIL # BLD AUTO: 0.1 10^3/UL (ref 0–0.3)
EOSINOPHIL NFR BLD AUTO: 2 % (ref 0–10)
ERYTHROCYTE [DISTWIDTH] IN BLOOD BY AUTOMATED COUNT: 12.5 % (ref 10–14.5)
GFR SERPLBLD BASED ON 1.73 SQ M-ARVRAT: 60 ML/MIN
GLUCOSE SERPL-MCNC: 83 MG/DL (ref 70–105)
INR PPP: 1 (ref 0.8–1.4)
KETONES UR QL STRIP: NEGATIVE
LEUKOCYTE ESTERASE UR QL STRIP: (no result)
LYMPHOCYTES # BLD AUTO: 1.5 X 10^3 (ref 1–4)
LYMPHOCYTES NFR BLD AUTO: 22 % (ref 12–44)
MAGNESIUM SERPL-MCNC: 2.2 MG/DL (ref 1.8–2.4)
MCH RBC QN AUTO: 30 PG (ref 25–34)
MCHC RBC AUTO-ENTMCNC: 32 G/DL (ref 32–36)
MCV RBC AUTO: 92 FL (ref 80–99)
MONOCYTES # BLD AUTO: 0.6 X 10^3 (ref 0–1)
MONOCYTES NFR BLD AUTO: 9 % (ref 0–12)
MYOGLOBIN SERPL-MCNC: 111.5 NG/ML (ref 10–92)
NEUTROPHILS # BLD AUTO: 4.5 X 10^3 (ref 1.8–7.8)
NEUTROPHILS NFR BLD AUTO: 67 % (ref 42–75)
NITRITE UR QL STRIP: NEGATIVE
PH UR STRIP: 5 [PH] (ref 5–9)
PLATELET # BLD: 198 10^3/UL (ref 130–400)
PMV BLD AUTO: 10.3 FL (ref 7.4–10.4)
POTASSIUM SERPL-SCNC: 3.5 MMOL/L (ref 3.6–5)
PROT SERPL-MCNC: 7.2 GM/DL (ref 6.4–8.2)
PROT UR QL STRIP: (no result)
PROTHROMBIN TIME: 12.6 SEC (ref 12.2–14.7)
RBC # BLD AUTO: 4.08 10^6/UL (ref 4.35–5.85)
SODIUM SERPL-SCNC: 146 MMOL/L (ref 135–145)
SP GR UR STRIP: 1.02 (ref 1.02–1.02)
SQUAMOUS #/AREA URNS HPF: (no result) /HPF
UROBILINOGEN UR-MCNC: 1 MG/DL
WBC # BLD AUTO: 6.7 10^3/UL (ref 4.3–11)

## 2017-07-25 PROCEDURE — 84484 ASSAY OF TROPONIN QUANT: CPT

## 2017-07-25 PROCEDURE — 93005 ELECTROCARDIOGRAM TRACING: CPT

## 2017-07-25 PROCEDURE — 83735 ASSAY OF MAGNESIUM: CPT

## 2017-07-25 PROCEDURE — 81000 URINALYSIS NONAUTO W/SCOPE: CPT

## 2017-07-25 PROCEDURE — 83874 ASSAY OF MYOGLOBIN: CPT

## 2017-07-25 PROCEDURE — 85610 PROTHROMBIN TIME: CPT

## 2017-07-25 PROCEDURE — 36415 COLL VENOUS BLD VENIPUNCTURE: CPT

## 2017-07-25 PROCEDURE — 96365 THER/PROPH/DIAG IV INF INIT: CPT

## 2017-07-25 PROCEDURE — 70450 CT HEAD/BRAIN W/O DYE: CPT

## 2017-07-25 PROCEDURE — 93041 RHYTHM ECG TRACING: CPT

## 2017-07-25 PROCEDURE — 80053 COMPREHEN METABOLIC PANEL: CPT

## 2017-07-25 PROCEDURE — 85730 THROMBOPLASTIN TIME PARTIAL: CPT

## 2017-07-25 PROCEDURE — 72125 CT NECK SPINE W/O DYE: CPT

## 2017-07-25 PROCEDURE — 87088 URINE BACTERIA CULTURE: CPT

## 2017-07-25 PROCEDURE — 71010: CPT

## 2017-07-25 PROCEDURE — 85025 COMPLETE CBC W/AUTO DIFF WBC: CPT

## 2017-07-25 NOTE — ED CHEST PAIN
General


Chief Complaint:  Trauma-Non Activation


Stated Complaint:  PT FELL AND HIT HEAD/INJ RT HIP


Nursing Triage Note:  


FELL FROM STANDING POSITION, C/O RIGHT HIP PAIN. DENIES LOC/ OTHER INJURIES. 


BRUISE TO RIGHT HIP


Nursing Sepsis Screen:  No Definite Risk


Source:  patient


Exam Limitations:  no limitations





History of Present Illness


Time seen by provider:  19:18


Initial Comments


This 86-year-old woman presents to the emergency room with complaints of fall 

at home.  She got up from a nap because of a family disturbance in the yard.  

Upon walking outside she stumbled on the threshold and fell striking her head 

on concrete.  She complains of pain and a hematoma on the right hip as well as 

some head pain.  She denies any loss of consciousness.  During the course of my 

interview, she comments that she also had some sharp brief chest pains earlier 

in the day.  Patient does have a significant history of coronary artery disease 

with numerous stents.  She is a patient of Dr. Resendiz.  She takes Plavix.  On 

exam she does have a hematoma on the right hip.  She has no pain with 

weightbearing and is ambulatory.


Location Injury Occurred:  HOME





Allergies and Home Medications


Allergies


Coded Allergies:  


     Valerio Known Allergies (Verified  Allergy, Unknown, 06)





Home Medications


Amlodipine Besylate 5 Mg Tab, 5 MG PO DAILY, #30 Ref 0


   Prescribed by: GAGAN FARFAN on 14 1124


Cephalexin 500 Mg Capsule, 500 MG PO QID, #28


   Prescribed by: EVELIA PHILLIPS on 17 2121


Clopidogrel Bisulfate 75 Mg Tab, 75 MG PO DAILY, #30 Ref 5


   Prescribed by: DOUG CHERRY on 6/24/15 0902


Famotidine 20 Mg Tablet, 1 EACH PO BID, #60 Ref 0


   Prescribed by: GAGAN FARFAN on 14 1124


Ferrous Fumarate 324 Mg Tablet, 324 MG PO BID, #60 Ref 0


   Prescribed by: GAGAN FARFAN on 14 1126


Isosorbide Mononitrate 30 Mg Tab.sr.24h, 30 MG PO DAILY, (Reported)


Metoprolol Tartrate 100 Mg Tablet, 50 MG PO BID, (Reported)


   TAKES 1/2 (100MG) TABLET TWICE DAILY 


Nitroglycerin 0.4 Mg Tab, 0 SL PRN PRN for CHEST PAIN, (Reported)


   1 TAB EVERY 5 MINUTES X 3 DOSES AS NEEDED FOR CHEST PAIN 


Ondansetron Hcl 4 Mg Tab, 4 MG SL Q4H PRN for NAUSEA/VOMITING, #10


   FOR NAUSEA AND VOMITING 


   Prescribed by: EVELIA PHILLIPS on 2/26/15 2050


Pravastatin Sodium 40 Mg Tablet, 40 MG PO HS, (Reported)





Review of Systems


Constitutional:  no symptoms reported


EENTM:  No Symptoms Reported


Respiratory:  No Symptoms Reported


Cardiovascular:  See HPI


Gastrointestinal:  No Symptoms Reported


Genitourinary:  No Symptoms Reported


Musculoskeletal:  see HPI


Skin:  see HPI


Psychiatric/Neurological:  No Symptoms Reported


Endocrine:  No Symptoms Reported





Past Medical-Social-Family Hx


Patient Social History


Alcohol Use:  Denies Use


Recreational Drug Use:  No


Smoking Status:  Never a Smoker


2nd Hand Smoke Exposure:  No


Recent Foreign Travel:  No


Contact w/Someone Who Travel:  No


Recent Infectious Disease Expo:  No


Recent Hopitalizations:  Yes





Immunizations Up To Date


Tetanus Booster (TDap):  Less than 5yrs


PED Vaccines UTD:  No


Date of Pneumonia Vaccine:  Sep 20, 2016


Date of Influenza Vaccine:  Sep 29, 2016





Seasonal Allergies


Seasonal Allergies:  No





Surgeries


HX Surgeries:  Yes (STENTS X 8 + ANGIOPLASTY)


Surgeries:  Cardiac, Coronary Stent, Eye Surgery, Gallbladder





Respiratory


Hx Respiratory Disorders:  No





Cardiovascular


Hx Cardiac Disorders:  Yes


Cardiac Disorders:  Chronic Edema/Swelling, Coronary Artery Disease, High 

Cholesterol, Hypertension, Peripheral Vascular





Neurological


Hx Neurological Disorders:  No





Reproductive System


Pregnant:  No


Hx Reproductive Disorders:  No


Sexually Transmitted Disease:  No


HIV/AIDS:  No


Female Reproductive Disorders:  Denies


GYN History:  Menopausal





Genitourinary


Hx Genitourinary Disorders:  No





Gastrointestinal


Hx Gastrointestinal Disorders:  Yes


Gastrointestinal Disorders:  Gastrointestinal Bleed





Musculoskeletal


Hx Musculoskeletal Disorders:  No





Endocrine


Hx Endocrine Disorders:  No





HEENT


HX ENT Disorders:  Yes


HEENT Disorders:  Cataract, Macular Degeneration


Loss of Vision:  Denies


Hearing Impairment:  Denies





Cancer


Hx Cancer:  No





Psychosocial


Hx Psychiatric Problems:  No





Integumentary


HX Skin/Integumentary Disorder:  No





Blood Transfusions


Hx Blood Disorders:  Yes


Adverse Reaction to a Blood Tr:  No





Family Medical History


Family Medial History:  


Cancer


  09 BROTHER, Onset:25's - 30


Chest pain


  03 FATHER, Onset:60 years & older


  03 MOTHER, Onset:60 years & older


Family history: Cardiovascular disease


  03 FATHER, Onset:60 years & older


  03 MOTHER, Onset:60 years & older


Family history: Diabetes mellitus


  03 FATHER, Onset:60 years & older


Family history: Hypertension


  03 MOTHER, Onset:50's - 60


Hypercholesterolemia


  03 FATHER, Onset:50's - 60


Myocardial infarction


  03 FATHER, Onset:60 years & older


  03 MOTHER, Onset:60 years & older





No Family History of:


  Abdominal aortic aneurysm


  Bingham's disease


  Alcoholism


  Aphasia


  Cancer of colon


  Cataract


  Congenital heart disease


  Congestive heart failure


  Cystic fibrosis


  Dementia


  Dysphagia


  Family history: Allergy


  Family history: Alzheimer's disease


  Family history: Arthritis


  Family history: Asthma


  Family history: Breast disease


  Family history: Coronary thrombosis


  Family history: Gastrointestinal disease


  Family history: Glaucoma


  Family history: Osteoporosis


  Family history: Thyroid disorder


  Headache


  Hearing loss


  Heart disease


  Hereditary disease


  History of - anemia


  History of - disorder


  History of - respiratory disease


  History of drug abuse


  Human immunodeficiency virus (HIV) seropositivity


  Infertile


  Kidney disease


  Malignant neoplasm of lung


  Parkinson's disease


  Prostate cancer


  Psychotic disorder


  Seizure disorder


  Stroke


  Tuberculosis


  Visual impairment





Physical Exam


Vital Signs





Vital Sign - Last 12Hours








 17





 19:12 19:55


 


Temp 97.6 


 


Pulse 68 


 


Resp 18 


 


B/P (MAP) 178/79 


 


Pulse Ox 98 


 


O2 Delivery Room Air 


 


O2 Flow Rate  2.00





Capillary Refill : Less Than 3 Seconds


General Appearance:  No Apparent Distress, WD/WN


HEENT:  PERRL/EOMI, Normal ENT Inspection


Neck:  Full Range of Motion, Normal Inspection, Non Tender


Respiratory:  Lungs Clear, Normal Breath Sounds, No Accessory Muscle Use, No 

Respiratory Distress


Cardiovascular:  Regular Rate, Rhythm, No Edema, No Murmur, Normal Peripheral 

Pulses


Gastrointestinal:  Non Tender, Soft


Extremity:  No Pedal Edema, Other (Hematoma with ecchymosis and swelling over 

the lateral right hip.  No joint tenderness.  No pain with range of motion of 

the right hip.)


Neurologic/Psychiatric:  Alert, Oriented x3, No Motor/Sensory Deficits, Normal 

Mood/Affect, CNs II-XII Norm as Tested


Skin:  Normal Color, Warm/Dry, Ecchymosis





Progress/Results/Core Measures


Results/Orders


Lab Results





Laboratory Tests








Test


  17


19:45 17


19:50 Range/Units


 


 


Urine Color YELLOW    


 


Urine Clarity CLEAR    


 


Urine pH 5   5-9  


 


Urine Specific Gravity 1.025 H  1.016-1.022  


 


Urine Protein 1+ H  NEGATIVE  


 


Urine Glucose (UA) NEGATIVE   NEGATIVE  


 


Urine Ketones NEGATIVE   NEGATIVE  


 


Urine Nitrite NEGATIVE   NEGATIVE  


 


Urine Bilirubin 1+ H  NEGATIVE  


 


Urine Urobilinogen 1   NORMAL  MG/DL


 


Urine Leukocyte Esterase 3+ H  NEGATIVE  


 


Urine RBC (Auto) 1+ H  NEGATIVE  


 


Urine RBC 2-5 H   /HPF


 


Urine WBC 10-25 H   /HPF


 


Urine Squamous Epithelial


Cells 0-2 


  


   /HPF


 


 


Urine Crystals NONE    /LPF


 


Urine Bacteria FEW H   /HPF


 


Urine Casts NONE    /LPF


 


Urine Mucus SMALL H   /LPF


 


Urine Culture Indicated YES    


 


White Blood Count


  


  6.7 


  4.3-11.0


10^3/uL


 


Red Blood Count


  


  4.08 L


  4.35-5.85


10^6/uL


 


Hemoglobin  12.1  11.5-16.0  G/DL


 


Hematocrit  38  35-52  %


 


Mean Corpuscular Volume  92  80-99  FL


 


Mean Corpuscular Hemoglobin  30  25-34  PG


 


Mean Corpuscular Hemoglobin


Concent 


  32 


  32-36  G/DL


 


 


Red Cell Distribution Width  12.5  10.0-14.5  %


 


Platelet Count


  


  198 


  130-400


10^3/uL


 


Mean Platelet Volume  10.3  7.4-10.4  FL


 


Neutrophils (%) (Auto)  67  42-75  %


 


Lymphocytes (%) (Auto)  22  12-44  %


 


Monocytes (%) (Auto)  9  0-12  %


 


Eosinophils (%) (Auto)  2  0-10  %


 


Basophils (%) (Auto)  0  0-10  %


 


Neutrophils # (Auto)  4.5  1.8-7.8  X 10^3


 


Lymphocytes # (Auto)  1.5  1.0-4.0  X 10^3


 


Monocytes # (Auto)  0.6  0.0-1.0  X 10^3


 


Eosinophils # (Auto)


  


  0.1 


  0.0-0.3


10^3/uL


 


Basophils # (Auto)


  


  0.0 


  0.0-0.1


10^3/uL


 


Prothrombin Time  12.6  12.2-14.7  SEC


 


INR Comment  1.0  0.8-1.4  


 


Activated Partial


Thromboplast Time 


  26 


  24-35  SEC


 


 


Sodium Level  146 H 135-145  MMOL/L


 


Potassium Level  3.5 L 3.6-5.0  MMOL/L


 


Chloride Level  109 H   MMOL/L


 


Carbon Dioxide Level  21  21-32  MMOL/L


 


Anion Gap  16 H 5-14  MMOL/L


 


Blood Urea Nitrogen  23 H 7-18  MG/DL


 


Creatinine


  


  0.89 


  0.60-1.30


MG/DL


 


Estimat Glomerular Filtration


Rate 


  60 


   


 


 


BUN/Creatinine Ratio  26   


 


Glucose Level  83    MG/DL


 


Calcium Level  9.6  8.5-10.1  MG/DL


 


Magnesium Level  2.2  1.8-2.4  MG/DL


 


Total Bilirubin  0.5  0.1-1.0  MG/DL


 


Aspartate Amino Transf


(AST/SGOT) 


  14 


  5-34  U/L


 


 


Alanine Aminotransferase


(ALT/SGPT) 


  10 


  0-55  U/L


 


 


Alkaline Phosphatase  61    U/L


 


Myoglobin


  


  111.5 H


  10.0-92.0


NG/ML


 


Troponin I  < 0.30  <0.30  NG/ML


 


Total Protein  7.2  6.4-8.2  GM/DL


 


Albumin  4.3  3.2-4.5  GM/DL








My Orders





Orders - EVELIA JOY MD


Ct Head/Cervical Spine Wo (17 19:18)


Ua Culture If Indicated (17 19:21)


Cbc With Automated Diff (17 19:30)


Magnesium (17 19:30)


Chest 1 View, Ap/Pa Only (17 19:30)


Ekg Tracing (17 19:30)


Cardiac Profile 1 (17 19:30)


Comprehensive Metabolic Panel (17 19:30)


Myoglobin Serum (17 19:30)


Protime With Inr (17 19:30)


Partial Thromboplastin Time (17 19:30)


O2 (17 19:30)


Monitor-Rhythm Ecg Trace Only (17 19:30)


Saline Lock/Iv-Start (17 19:30)


Urine Culture (17 19:45)


Ceftriaxone Injection (Rocephin Injectio (17 20:30)





Medications Given in ED





Vital Signs/I&O





Vital Sign - Last 12Hours








 17





 19:12 19:55 21:27


 


Temp 97.6  97.6


 


Pulse 68  67


 


Resp 18  19


 


B/P (MAP) 178/79  


 


Pulse Ox 98 97 100


 


O2 Delivery Room Air Nasal Cannula Room Air


 


O2 Flow Rate  2.00 














Intake and Output 


 


 17





 00:00


 


Intake Total 50 ml


 


Balance 50 ml














Blood Pressure Mean:  112








Progress Note :  


Progress Note


Initial cardiac workup was unremarkable.  There were no injuries requiring 

treatment.  Patient had no further chest pain while in the emergency room.  

Case was reviewed with Dr. Harris who recommended a 3 hour troponin rule out.  

Patient refused to stay in the emergency room for the rule out.  Her risks of 

leaving AGAINST MEDICAL ADVICE were described to her.  She chose to leave 

anyway.  She did receive Rocephin 1000 mg prior to dismissal and a prescription 

for Keflex for treatment of urinary tract infection.  She was advised to follow-

up with her cardiologist as soon as possible.





ECG


Initial ECG Impression Date:  2017


Initial ECG Impression Time:  19:52


Initial ECG Rate:  59


Initial ECG Rhythm:  Normal Sinus


Initial ECG Impression:  Normal


Comment


Normal sinus rhythm with no ST elevation or depression.  No abnormal intervals 

or axis deviation.





Diagnostic Imaging





   Diagonstic Imaging:  Xray


   Plain Films/CT/US/NM/MRI:  chest


Comments


chest x-ray viewed by me and report reviewed.  No acute abnormalities 

appreciated.








   Diagonstic Imaging:  CT


   Plain Films/CT/US/NM/MRI:  c-spine, head


Comments


CT head and cervical spine viewed by me and report reviewed.  See report below:





NAME:   MAYA BRUNO  


[facility] REC#:   [id number]  


ACCOUNT#:   [id number]  


PT STATUS:   REG ER  


:   1931  


PHYSICIAN:   VEELIA JOY MD  


ADMIT DATE:   17/ER  


 ***Draft***  


Date of Exam:17  


 


CT HEAD/CERVICAL SPINE WO  


 


PROCEDURE: CT head and CT cervical spine without contrast.  


 


 TECHNIQUE: Multiple contiguous axial images were obtained through  


 the brain and cervical spine without the use of intravenous  


 contrast. Sagittal and coronal reformations through the cervical  


 spine were then performed.  


 


 INDICATION: Fall, head pain.  


 


 Head CT:  


 


 Compared 2015.  


 


 There is no intracranial hemorrhage. There is no hydrocephalus,  


 edema, mass or mass effect. No paranasal sinus air-fluid level.  


 No findings of focal or generalized edema and no evidence for  


 elevated pressures. No hemo-sinus or fracture demonstrated.  


 


 CT cervical spine: Body heights maintained, the alignment  


 anatomic. No high-grade stenosis. No fracture or paravertebral  


 hemorrhage. No facet joint dislocation. Carotid vascular  


 calcifications chronic. Thoracic inlet and visualized pulmonary  


 apices nonacute.  


 


 IMPRESSION:  


 


 CT head: No hemorrhage, fracture deformity or acute pathology  


 


 CT cervical spine: No fracture, traumatic malalignment or  


 substantial stenosis.  


 


   Dictated on workstation # [id number]  


 


Dict:   17  


Trans:   17  


[facility] [id number]  


 


Interpreted by:     LYDIA SCHREIBER





Departure


Impression


Impression:  


 Primary Impression:  


 Chest pain


 Qualified Codes:  R07.9 - Chest pain, unspecified


 Additional Impressions:  


 Hip hematoma, right


 Qualified Codes:  S70.01XA - Contusion of right hip, initial encounter


 Fall on same level


 Qualified Codes:  W18.30XA - Fall on same level, unspecified, initial encounter


 Minor head injury


 Qualified Codes:  S00.90XA - Unspecified superficial injury of unspecified 

part of head, initial encounter


 Urinary tract infection


 Qualified Codes:  N39.0 - Urinary tract infection, site not specified


Disposition:   AGAINST MEDICAL ADVICE


Condition:  Against Medical Advice





Departure-Patient Inst.


Referrals:  


ASHLEY MARMOLEJO DO (PCP/Family)


Primary Care Physician


Patient Instructions:  Chest Pain, Urinary Tract Infection, Adult (DC)





Add. Discharge Instructions:  


Complete your antibiotics as prescribed.  Follow-up on your urine culture with 

your primary care provider on Thursday afternoon or Friday morning.


Return to the ER if symptoms worsen.


Follow-up with your cardiologist tomorrow morning.











All discharge instructions reviewed with patient and/or family. Voiced 

understanding.


Scripts


Cephalexin (Keflex) 500 Mg Capsule


500 MG PO QID, #28 CAP


   Prov: EVELIA JOY MD         17





Copy


Copies To 1:   JACINTA RESENDIZ MD FACE.[facility] CCDS


Copies To 2:   ASHLEY MARMOLEJO JOSHUA T MD 2017 19:58

## 2017-07-25 NOTE — DIAGNOSTIC IMAGING REPORT
INDICATION: Fall, chest pain.



COMPARISON: 8/16/2015.



FINDINGS:  

The lungs are clear. The heart and vessels are within normal

limits. There is no effusion or pneumothorax.



IMPRESSION:

No acute appearing abnormality, no change from prior.



Dictated by: 



  Dictated on workstation # [id number]

## 2017-07-25 NOTE — DIAGNOSTIC IMAGING REPORT
PROCEDURE: CT head and CT cervical spine without contrast.



TECHNIQUE: Multiple contiguous axial images were obtained through

the brain and cervical spine without the use of intravenous

contrast. Sagittal and coronal reformations through the cervical

spine were then performed.



INDICATION: Fall, head pain.



Head CT:



Compared 12/30/2015.



There is no intracranial hemorrhage. There is no hydrocephalus,

edema, mass or mass effect. No paranasal sinus air-fluid level.

No findings of focal or generalized edema and no evidence for

elevated pressures. No hemo-sinus or fracture demonstrated.



CT cervical spine: Body heights maintained, the alignment

anatomic. No high-grade stenosis. No fracture or paravertebral

hemorrhage. No facet joint dislocation. Carotid vascular

calcifications chronic. Thoracic inlet and visualized pulmonary

apices nonacute.



IMPRESSION:



CT head: No hemorrhage, fracture deformity or acute pathology



CT cervical spine: No fracture, traumatic malalignment or

substantial stenosis.



 



Dictated by: 



  Dictated on workstation # [id number]

## 2017-12-30 ENCOUNTER — HOSPITAL ENCOUNTER (EMERGENCY)
Dept: HOSPITAL 75 - ER | Age: 82
Discharge: HOME | End: 2017-12-30
Payer: MEDICARE

## 2017-12-30 VITALS — SYSTOLIC BLOOD PRESSURE: 230 MMHG | DIASTOLIC BLOOD PRESSURE: 113 MMHG

## 2017-12-30 VITALS — WEIGHT: 130 LBS | HEIGHT: 62 IN | BODY MASS INDEX: 23.92 KG/M2

## 2017-12-30 DIAGNOSIS — R07.89: Primary | ICD-10-CM

## 2017-12-30 DIAGNOSIS — Z87.19: ICD-10-CM

## 2017-12-30 DIAGNOSIS — N39.0: ICD-10-CM

## 2017-12-30 DIAGNOSIS — I10: ICD-10-CM

## 2017-12-30 DIAGNOSIS — E78.00: ICD-10-CM

## 2017-12-30 DIAGNOSIS — Z95.1: ICD-10-CM

## 2017-12-30 DIAGNOSIS — I73.9: ICD-10-CM

## 2017-12-30 DIAGNOSIS — I25.10: ICD-10-CM

## 2017-12-30 DIAGNOSIS — Z82.49: ICD-10-CM

## 2017-12-30 DIAGNOSIS — Z95.5: ICD-10-CM

## 2017-12-30 LAB
ALBUMIN SERPL-MCNC: 3.9 GM/DL (ref 3.2–4.5)
ALP SERPL-CCNC: 55 U/L (ref 40–136)
ALT SERPL-CCNC: 6 U/L (ref 0–55)
APTT BLD: 24 SEC (ref 24–35)
APTT PPP: YELLOW S
BACTERIA #/AREA URNS HPF: NEGATIVE /HPF
BASOPHILS # BLD AUTO: 0 10^3/UL (ref 0–0.1)
BASOPHILS NFR BLD AUTO: 0 % (ref 0–10)
BILIRUB SERPL-MCNC: 0.8 MG/DL (ref 0.1–1)
BILIRUB UR QL STRIP: NEGATIVE
BUN/CREAT SERPL: 24
CALCIUM SERPL-MCNC: 9.1 MG/DL (ref 8.5–10.1)
CHLORIDE SERPL-SCNC: 104 MMOL/L (ref 98–107)
CO2 SERPL-SCNC: 29 MMOL/L (ref 21–32)
CREAT SERPL-MCNC: 0.78 MG/DL (ref 0.6–1.3)
EOSINOPHIL # BLD AUTO: 0 10^3/UL (ref 0–0.3)
EOSINOPHIL NFR BLD AUTO: 1 % (ref 0–10)
ERYTHROCYTE [DISTWIDTH] IN BLOOD BY AUTOMATED COUNT: 13 % (ref 10–14.5)
FIBRINOGEN PPP-MCNC: (no result) MG/DL
GFR SERPLBLD BASED ON 1.73 SQ M-ARVRAT: > 60 ML/MIN
GLUCOSE SERPL-MCNC: 105 MG/DL (ref 70–105)
GLUCOSE UR STRIP-MCNC: NEGATIVE MG/DL
HCT VFR BLD CALC: 39 % (ref 35–52)
HGB BLD-MCNC: 12.9 G/DL (ref 11.5–16)
INR PPP: 1 (ref 0.8–1.4)
KETONES UR QL STRIP: NEGATIVE
LEUKOCYTE ESTERASE UR QL STRIP: (no result)
LYMPHOCYTES # BLD AUTO: 1.2 X 10^3 (ref 1–4)
LYMPHOCYTES NFR BLD AUTO: 15 % (ref 12–44)
MANUAL DIFFERENTIAL PERFORMED BLD QL: NO
MCH RBC QN AUTO: 30 PG (ref 25–34)
MCHC RBC AUTO-ENTMCNC: 33 G/DL (ref 32–36)
MCV RBC AUTO: 90 FL (ref 80–99)
MONOCYTES # BLD AUTO: 0.4 X 10^3 (ref 0–1)
MONOCYTES NFR BLD AUTO: 5 % (ref 0–12)
NEUTROPHILS # BLD AUTO: 6.3 X 10^3 (ref 1.8–7.8)
NEUTROPHILS NFR BLD AUTO: 79 % (ref 42–75)
NITRITE UR QL STRIP: NEGATIVE
PH UR STRIP: 6.5 [PH] (ref 5–9)
PLATELET # BLD: 196 10^3/UL (ref 130–400)
PMV BLD AUTO: 9.8 FL (ref 7.4–10.4)
POTASSIUM SERPL-SCNC: 2.9 MMOL/L (ref 3.6–5)
PROT SERPL-MCNC: 6.5 GM/DL (ref 6.4–8.2)
PROT UR QL STRIP: (no result)
PROTHROMBIN TIME: 13.1 SEC (ref 12.2–14.7)
RBC # BLD AUTO: 4.34 10^6/UL (ref 4.35–5.85)
RBC #/AREA URNS HPF: (no result) /HPF
SODIUM SERPL-SCNC: 145 MMOL/L (ref 135–145)
SP GR UR STRIP: 1.01 (ref 1.02–1.02)
SQUAMOUS #/AREA URNS HPF: (no result) /HPF
UROBILINOGEN UR-MCNC: 1 MG/DL
WBC # BLD AUTO: 8 10^3/UL (ref 4.3–11)
WBC #/AREA URNS HPF: (no result) /HPF

## 2017-12-30 PROCEDURE — 36415 COLL VENOUS BLD VENIPUNCTURE: CPT

## 2017-12-30 PROCEDURE — 85025 COMPLETE CBC W/AUTO DIFF WBC: CPT

## 2017-12-30 PROCEDURE — 81000 URINALYSIS NONAUTO W/SCOPE: CPT

## 2017-12-30 PROCEDURE — 80053 COMPREHEN METABOLIC PANEL: CPT

## 2017-12-30 PROCEDURE — 71020: CPT

## 2017-12-30 PROCEDURE — 84484 ASSAY OF TROPONIN QUANT: CPT

## 2017-12-30 PROCEDURE — 85730 THROMBOPLASTIN TIME PARTIAL: CPT

## 2017-12-30 PROCEDURE — 85610 PROTHROMBIN TIME: CPT

## 2017-12-30 NOTE — DIAGNOSTIC IMAGING REPORT
EXAMINATION: CHEST (PA AND LATERAL)



CLINICAL INDICATION: 86-year-old female, chest pain.



COMPARISON: July 25, 2017.



FINDINGS: Heart size and mediastinal contours are unremarkable.

There is no identified pneumothorax. There is no pleural

effusion. There is no identified focal airspace consolidation.



IMPRESSION: No identified acute cardiopulmonary abnormality.



Dictated by: 



  Dictated on workstation # [id number]

## 2017-12-30 NOTE — ED GENERAL
General


Chief Complaint:  -Female


Stated Complaint:  ABD PAIN, DRY MOUTH, KINDEY INFECTION


Nursing Triage Note:  


AMB TO ROOM  REPORTS IS JUST SICK IS  BEING TX FOR UTI SINCE THURSDAY AND 2 

MEDS 


FOR BLOOD PRESSURE. LAST NIGHT BECAME DIZZY AND FELL.


Nursing Sepsis Screen:  No Definite Risk


Source of Information:  Patient, Family


Exam Limitations:  No Limitations





History of Present Illness


Time Seen by Provider:  12:00


Initial Comments


Here with report of concerns of having a urinary tract infection.  She is 

currently under treatment for this.  Also states that she had an episode of 

chest pressure that was brief but is better now and she is complaining of dry 

lips.  Overall she states she feels better now but just wanted to get checked 

out.  She does admit that she got dizzy in the middle the night and fell.  She 

states that this is not uncommon.  She was using her walker and fell back onto 

her bottom.  She did not hit her head.  She did not get knocked out.  Denies 

any pain or sequela from that fall.  She is no longer on blood thinners


Timing/Duration:  3-4 Days


Severity:  Mild


Associated Systoms:  Chest Pain (mild left-sided pressure that lasted for a 

little while and has subsequently resolved and stayed gone.), No Fever/Chills, 

No Headaches, No Nausea/Vomiting, No Shortness of Air, Weakness





Allergies and Home Medications


Allergies


Coded Allergies:  


     NKANo Known Allergies (Verified  Allergy, Unknown, 06)





Home Medications


Amlodipine Besylate 5 Mg Tab, 5 MG PO DAILY, #30 Ref 0


   Prescribed by: GAGAN FARFAN on 14 1124


Amoxicillin 500 Mg Capsule, (Reported)


Buspirone HCl 10 Mg Tablet, (Reported)


Cephalexin 500 Mg Capsule, 500 MG PO QID, #28


   Prescribed by: EVELIA PHILLIPS on 17 2121


Famotidine 20 Mg Tablet, 1 EACH PO BID, #60 Ref 0


   Prescribed by: GAGAN FARFAN on 14 1124


Ferrous Fumarate 324 Mg Tablet, 324 MG PO BID, #60 Ref 0


   Prescribed by: GAGAN FARFAN on 14 1126


Isosorbide Mononitrate 30 Mg Tab.sr.24h, 30 MG PO DAILY, (Reported)


Lisinopril 10 Mg Tablet, (Reported)


Metoprolol Tartrate 100 Mg Tablet, 50 MG PO BID, (Reported)


   TAKES 1/2 (100MG) TABLET TWICE DAILY 


Nitroglycerin 0.4 Mg Tab, 0 SL PRN PRN for CHEST PAIN, (Reported)


   1 TAB EVERY 5 MINUTES X 3 DOSES AS NEEDED FOR CHEST PAIN 


Ondansetron Hcl 4 Mg Tab, 4 MG SL Q4H PRN for NAUSEA/VOMITING, #10


   FOR NAUSEA AND VOMITING 


   Prescribed by: EVELIA PHILLIPS on 2/26/15 2050


Pravastatin Sodium 40 Mg Tablet, 40 MG PO HS, (Reported)





Constitutional:  see HPI, No chills, No fever


EENTM:  no symptoms reported


Respiratory:  no symptoms reported


Cardiovascular:  see HPI, chest pain, No edema


Gastrointestinal:  No abdominal pain, No nausea, No vomiting


Genitourinary:  see HPI


Musculoskeletal:  no symptoms reported


Skin:  no symptoms reported


Psychiatric/Neurological:  See HPI


All Other Systems Reviewed


Negative Unless Noted:  Yes





Past Medical-Social-Family Hx


Patient Social History


Alcohol Use:  Denies Use


Recreational Drug Use:  No


2nd Hand Smoke Exposure:  No


Recent Foreign Travel:  No


Contact w/Someone Who Travel:  No


Recent Infectious Disease Expo:  No


Recent Hopitalizations:  Yes





Immunizations Up To Date


Tetanus Booster (TDap):  Less than 5yrs


PED Vaccines UTD:  No


Date of Pneumonia Vaccine:  Sep 20, 2016


Date of Influenza Vaccine:  Sep 29, 2016





Seasonal Allergies


Seasonal Allergies:  No





Surgeries


History of Surgeries:  Yes (STENTS X 8 + ANGIOPLASTY)


Surgeries:  Cardiac, Coronary Stent, Eye Surgery, Gallbladder





Respiratory


History of Respiratory Disorde:  No





Cardiovascular


History of Cardiac Disorders:  Yes


Cardiac Disorders:  Chronic Edema/Swelling, Coronary Artery Disease, High 

Cholesterol, Hypertension, Peripheral Vascular





Neurological


History of Neurological Disord:  No





Reproductive System


Hx Reproductive Disorders:  No


Sexually Transmitted Disease:  No


HIV/AIDS:  No


Female Reproductive Disorders:  Denies


GYN History:  Menopausal





Genitourinary


History of Genitourinary Disor:  No





Gastrointestinal


History of Gastrointestinal Di:  Yes


Gastrointestinal Disorders:  Gastrointestinal Bleed





Musculoskeletal


History of Musculoskeletal Dis:  No





Endocrine


History of Endocrine Disorders:  No





HEENT


History of HEENT Disorders:  Yes


HEENT Disorders:  Cataract, Macular Degeneration


Loss of Vision:  Denies


Hearing Impairment:  Denies





Cancer


History of Cancer:  No





Psychosocial


History of Psychiatric Problem:  No





Integumentary


History of Skin or Integumenta:  No





Blood Transfusions


History of Blood Disorders:  Yes


Adverse Reaction to a Blood Tr:  No





Reviewed Nursing Assessment


Reviewed/Agree w Nursing PMH:  Yes





Family Medical History


Family Medial History:  


Cancer


  09 BROTHER, Onset:25's - 30


Chest pain


  03 FATHER, Onset:60 years & older


  03 MOTHER, Onset:60 years & older


Family history: Cardiovascular disease


  03 FATHER, Onset:60 years & older


  03 MOTHER, Onset:60 years & older


Family history: Diabetes mellitus


  03 FATHER, Onset:60 years & older


Family history: Hypertension


  03 MOTHER, Onset:50's - 60


Hypercholesterolemia


  03 FATHER, Onset:50's - 60


Myocardial infarction


  03 FATHER, Onset:60 years & older


  03 MOTHER, Onset:60 years & older





Physical Exam


Vital Signs





Vital Sign - Last 12Hours








 17





 10:46


 


Temp 98.9


 


Pulse 88


 


Resp 18


 


B/P (MAP) 218/99 (138)


 


Pulse Ox 98





Capillary Refill : Less Than 3 Seconds


General Appearance:  No Apparent Distress, WD/WN


HEENT:  PERRL/EOMI, Pharynx Normal


Neck:  Non Tender, Supple


Respiratory:  Lungs Clear, Normal Breath Sounds


Cardiovascular:  Regular Rate, Rhythm, No Murmur


Gastrointestinal:  Non Tender, Soft


Back:  Normal Inspection, No CVA Tenderness, No Vertebral Tenderness


Extremity:  Normal Range of Motion, Non Tender, No Calf Tenderness, Pelvis 

Stable


Neurologic/Psychiatric:  Alert, Oriented x3





Progress/Results/Core Measures


Suspected Sepsis


Recent Fever Within 48 Hours:  No


Infection Criteria Present:  None


New/Unexplained  Altered Menta:  No


Sepsis Screen:  No Definite Risk


Sepsis Diagnosis:  


SIRS


Temperature:98.9 


Pulse: 88 


Respiratory Rate: 18


 


Laboratory Tests


17 12:18: White Blood Count 8.0


Blood Pressure 218 /99 


Mean: 138


 





Laboratory Tests


17 12:18: 


Creatinine 0.78, INR Comment 1.0, Platelet Count 196, Total Bilirubin 0.8








Results/Orders


Lab Results





Laboratory Tests








Test


  17


11:04 17


12:18 Range/Units


 


 


Urine Color YELLOW    


 


Urine Clarity


  SLIGHTLY


CLOUDY 


   


 


 


Urine pH 6.5   5-9  


 


Urine Specific Gravity 1.015 L  1.016-1.022  


 


Urine Protein 3+ H  NEGATIVE  


 


Urine Glucose (UA) NEGATIVE   NEGATIVE  


 


Urine Ketones NEGATIVE   NEGATIVE  


 


Urine Nitrite NEGATIVE   NEGATIVE  


 


Urine Bilirubin NEGATIVE   NEGATIVE  


 


Urine Urobilinogen 1   NORMAL  MG/DL


 


Urine Leukocyte Esterase 1+ H  NEGATIVE  


 


Urine RBC (Auto) 1+ H  NEGATIVE  


 


Urine RBC RARE    /HPF


 


Urine WBC RARE    /HPF


 


Urine Squamous Epithelial


Cells NONE 


  


   /HPF


 


 


Urine Crystals NONE    /LPF


 


Urine Bacteria NEGATIVE    /HPF


 


Urine Casts NONE    /LPF


 


Urine Mucus NEGATIVE    /LPF


 


Urine Culture Indicated NO    


 


White Blood Count


  


  8.0 


  4.3-11.0


10^3/uL


 


Red Blood Count


  


  4.34 L


  4.35-5.85


10^6/uL


 


Hemoglobin  12.9  11.5-16.0  G/DL


 


Hematocrit  39  35-52  %


 


Mean Corpuscular Volume  90  80-99  FL


 


Mean Corpuscular Hemoglobin  30  25-34  PG


 


Mean Corpuscular Hemoglobin


Concent 


  33 


  32-36  G/DL


 


 


Red Cell Distribution Width  13.0  10.0-14.5  %


 


Platelet Count


  


  196 


  130-400


10^3/uL


 


Mean Platelet Volume  9.8  7.4-10.4  FL


 


Neutrophils (%) (Auto)  79 H 42-75  %


 


Lymphocytes (%) (Auto)  15  12-44  %


 


Monocytes (%) (Auto)  5  0-12  %


 


Eosinophils (%) (Auto)  1  0-10  %


 


Basophils (%) (Auto)  0  0-10  %


 


Neutrophils # (Auto)  6.3  1.8-7.8  X 10^3


 


Lymphocytes # (Auto)  1.2  1.0-4.0  X 10^3


 


Monocytes # (Auto)  0.4  0.0-1.0  X 10^3


 


Eosinophils # (Auto)


  


  0.0 


  0.0-0.3


10^3/uL


 


Basophils # (Auto)


  


  0.0 


  0.0-0.1


10^3/uL


 


Prothrombin Time  13.1  12.2-14.7  SEC


 


INR Comment  1.0  0.8-1.4  


 


Activated Partial


Thromboplast Time 


  24 


  24-35  SEC


 


 


Sodium Level  145  135-145  MMOL/L


 


Potassium Level  2.9 L 3.6-5.0  MMOL/L


 


Chloride Level  104    MMOL/L


 


Carbon Dioxide Level  29  21-32  MMOL/L


 


Anion Gap  12  5-14  MMOL/L


 


Blood Urea Nitrogen  19 H 7-18  MG/DL


 


Creatinine


  


  0.78 


  0.60-1.30


MG/DL


 


Estimat Glomerular Filtration


Rate 


  > 60 


   


 


 


BUN/Creatinine Ratio  24   


 


Glucose Level  105    MG/DL


 


Calcium Level  9.1  8.5-10.1  MG/DL


 


Total Bilirubin  0.8  0.1-1.0  MG/DL


 


Aspartate Amino Transf


(AST/SGOT) 


  11 


  5-34  U/L


 


 


Alanine Aminotransferase


(ALT/SGPT) 


  6 


  0-55  U/L


 


 


Alkaline Phosphatase  55    U/L


 


Troponin I  < 0.30  <0.30  NG/ML


 


Total Protein  6.5  6.4-8.2  GM/DL


 


Albumin  3.9  3.2-4.5  GM/DL








My Orders





Orders - YOVANNY HORTON MD


Ua Culture If Indicated (17 11:00)


Cbc With Automated Diff (17 12:09)


Comprehensive Metabolic Panel (17 12:09)


Protime With Inr (17 12:09)


Partial Thromboplastin Time (17 12:09)


Troponin I (17 12:09)


Chest Pa/Lat (2 View) (17 12:09)


Ekg Tracing (17 12:09)


Potassium Chloride (Tablet) (Klor Con Ta (17 13:30)





Vital Signs/I&O


Capillary Refill : Less Than 3 Seconds








Blood Pressure Mean:  138








Progress Note :  


Progress Note


Seen and evaluated.  UA ordered and done.  No acute findings on that.  I did 

speak with her about further evaluation regarding her chest pain and she 

decided that she would like to do that.  Labs, EKG and chest x-ray ordered.  

1321: No acute findings except for mild hyperkalemia.  Potassium 40 mEq by 

mouth given..  Discharged home with return precautions.  Patient verbalize 

understanding instructions and agreement with plan.  Family is going to take 

her home and has been in the ER throughout the stay.





ECG


Initial ECG Impression Date:  Dec 30, 2017


Initial ECG Impression Time:  11:56


Initial ECG Rate:  71


Initial ECG Rhythm:  Normal Sinus


Comment


Sinus rhythm with left atrial abnormality.  Left ventricular hypertrophy.  

Normal axis.  No evidence of ST elevation MI.  Interpreted by me.  Compares 

well to 2017.





Diagnostic Imaging





   Diagonstic Imaging:  Xray


   Plain Films/CT/US/NM/MRI:  chest


Comments


 VIA [facility], [facility].


 [place], [place]





NAME:   MAYA BRUNO


MED REC#:   [id number]


ACCOUNT#:   [id number]


PT STATUS:   REG ER


:   1931


PHYSICIAN:   YOVANNY HORTON MD


ADMIT DATE:   17/ER


 ***Draft***


Date of Exam:17





CHEST PA/LAT (2 VIEW)








EXAMINATION: CHEST (PA AND LATERAL)





CLINICAL INDICATION: 86-year-old female, chest pain.





COMPARISON: 2017.





FINDINGS: Heart size and mediastinal contours are unremarkable.


There is no identified pneumothorax. There is no pleural


effusion. There is no identified focal airspace consolidation.





IMPRESSION: No identified acute cardiopulmonary abnormality.








  Dictated on workstation # [id number]








Dict:   17 1254


Trans:   17 1255


[facility] [id number]





Interpreted by:     JAY PALENCIA MD


Electronically signed by:





Departure


Impression


Impression:  


 Primary Impression:  


 Chest pain


 Additional Impression:  


 Urinary tract infection


 Qualified Codes:  N30.00 - Acute cystitis without hematuria


Disposition:   HOME, SELF-CARE


Condition:  Improved





Departure-Patient Inst.


Decision time for Depature:  13:25


Referrals:  


ASHLEY MARMOLEJO DO (PCP/Family)


Primary Care Physician


Patient Instructions:  Chest Pain (DC), Urinary Tract Infection, Adult (DC)





Add. Discharge Instructions:  


All discharge instructions reviewed with patient and/or family. Voiced 

understanding.





Follow-up with your DrNicolas in a few days for recheck as needed.  Return for worse 

pain, fever, vomiting, weakness, breathing problems, chest pain or other 

concerns as needed.  Continue home medications as directed.











YOVANNY HORTON MD Dec 30, 2017 12:16

## 2018-01-22 ENCOUNTER — HOSPITAL ENCOUNTER (OUTPATIENT)
Dept: HOSPITAL 75 - RAD | Age: 83
End: 2018-01-22
Attending: FAMILY MEDICINE
Payer: MEDICARE

## 2018-01-22 DIAGNOSIS — M46.08: Primary | ICD-10-CM

## 2018-01-22 PROCEDURE — 72220 X-RAY EXAM SACRUM TAILBONE: CPT

## 2018-01-22 NOTE — DIAGNOSTIC IMAGING REPORT
INDICATION: Bone spur.



TIME OF EXAM: 10:44 AM



COMPARISON: Correlation is made with prior radiographs from

07/11/2016.



FINDINGS: Large lateral osteophytes at the L4 and L5 levels are

noted, similar to prior exam. The SI joints are unremarkable.

Sacrum and coccyx appear unremarkable. No fractures are seen. The

rami are unremarkable.



IMPRESSION: No acute abnormality is detected.



Dictated by: 



  Dictated on workstation # [id number]